# Patient Record
Sex: FEMALE | Race: WHITE | NOT HISPANIC OR LATINO | Employment: OTHER | URBAN - METROPOLITAN AREA
[De-identification: names, ages, dates, MRNs, and addresses within clinical notes are randomized per-mention and may not be internally consistent; named-entity substitution may affect disease eponyms.]

---

## 2017-12-13 ENCOUNTER — THERAPY VISIT (OUTPATIENT)
Dept: PHYSICAL THERAPY | Facility: CLINIC | Age: 67
End: 2017-12-13
Payer: MEDICARE

## 2017-12-13 DIAGNOSIS — R26.9 ABNORMAL GAIT: ICD-10-CM

## 2017-12-13 DIAGNOSIS — M79.672 LEFT FOOT PAIN: Primary | ICD-10-CM

## 2017-12-13 DIAGNOSIS — Z98.890 S/P FOOT SURGERY: ICD-10-CM

## 2017-12-13 PROCEDURE — 97161 PT EVAL LOW COMPLEX 20 MIN: CPT | Mod: GP | Performed by: PHYSICAL THERAPIST

## 2017-12-13 PROCEDURE — G8979 MOBILITY GOAL STATUS: HCPCS | Mod: GP | Performed by: PHYSICAL THERAPIST

## 2017-12-13 PROCEDURE — 97110 THERAPEUTIC EXERCISES: CPT | Mod: GP | Performed by: PHYSICAL THERAPIST

## 2017-12-13 PROCEDURE — G8978 MOBILITY CURRENT STATUS: HCPCS | Mod: GP | Performed by: PHYSICAL THERAPIST

## 2017-12-13 NOTE — PROGRESS NOTES
[facility] Evaluation  Subjective:  Physical Therapy Initial Evaluation   12/13/17  DIANE Rosa MD Precautions/Restrictions/MD instructions: Revision L 3rd TMT fusion, removal of ORIF of 2nd/3rd TMT, reverse Akin osteotomy of prox phalanx of L Great toe, E and T 10-12 visits   Therapist Impression:   Pt is a 68 y/o female, 7 wks s/p above procedure. Pt presents with pain, reduced ROM, weakness, reduced mm activation, and reduced soft tissue & scar mobility consistent with post op status. These impairments limit their ability to ambulate, navigate stairs, perform ADL's, and exercise. Skilled PT services necessary in order to reduce impairments and improve independent function.    Subjective:   Chief Complaint: Pt seven weeks s/p above procedure. Pt reports chronic bilateral foot pain with a lot of difficulty getting into fully plantarflexed position. Pt had been using short boot for six weeks and has been out of the boot for about a week now. Pt reports that the only time she is having pain is with walking now.  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 - be able to walk around with ease  Medications: thyroid, sleep  General health as reported by patient: good  Return to MD: 5 weeks  HPI                     Objective:  Ankle Examination    Gait: Reduced push off at TSt and muted pronation during MidSt on L    Transfers: mild use of UE's for sit to/from stand    Integumentary/Incision: Incisios healing well without overt signs of infection. Dorsal incision with reduced mobility    Edema:    General: mild dorsal and lateral foot as well as dorsal and medial great toe      ROM:   Talocrural Inv  Ev Subtalar Midtarsal Great Toe   Left 25-0-10 WNL WNL hypo hypo 0-10-80   Right 25-0-40 WNL WNL WNL WNL 15-0-75     Ankle Muscle Activation:   Right: grossly 5/5   Left: Ant tib and ext dig 5/5, post tib and peroneals 4/5    System    Physical Exam    General     ROS    Assessment/Plan:      Patient is a 67 year old female with L foot complaints.    Patient has the following significant findings with corresponding treatment plan.                Diagnosis 1:   Revision L 3rd TMT fusion, removal of ORIF of 2nd/3rd TMT, reverse Akin osteotomy of prox phalanx of L Great toe  Pain -  hot/cold therapy, manual therapy, splint/taping/bracing/orthotics, education, directional preference exercise and home program  Decreased ROM/flexibility - manual therapy and therapeutic exercise  Decreased joint mobility - manual therapy and therapeutic exercise  Decreased strength - therapeutic exercise and therapeutic activities  Decreased proprioception - neuro re-education and therapeutic activities  Inflammation - cold therapy  Edema - cold therapy  Impaired gait - gait training  Impaired muscle performance - neuro re-education  Decreased function - therapeutic activities    Therapy Evaluation Codes:   1) History comprised of:   Personal factors that impact the plan of care:      Past/current experiences and Time since onset of symptoms.    Comorbidity factors that impact the plan of care are:      Asthma, Depression, Menopausal, Osteoarthritis and hypothyroid, past osteoporosis.     Medications impacting care: Sleep and thyroid.  2) Examination  of Body Systems comprised of:   Body structures and functions that impact the plan of care:      Ankle and foot.   Activity limitations that impact the plan of care are:      Driving, Dressing, Squatting/kneeling, Stairs, Standing, Walking, Working and Sleeping.  3) Clinical presentation characteristics are:   Stable/Uncomplicated.  4) Decision-Making    Low complexity using standardized patient assessment instrument and/or measureable assessment of functional outcome.  Cumulative Therapy Evaluation is: Low complexity.    Previous and current functional limitations:  (See Goal Flow Sheet for this information)    Short term and Long term goals: (See Goal Flow Sheet for this information)     Communication ability:  Patient appears to be able to clearly communicate and understand verbal and written communication and follow directions correctly.  Treatment Explanation - The following has been discussed with the patient:   RX ordered/plan of care  Anticipated outcomes  Possible risks and side effects  This patient would benefit from PT intervention to resume normal activities.   Rehab potential is good.    Frequency:  1 X week, once daily  Duration:  for 10 weeks  Discharge Plan:  Achieve all LTG.  Independent in home treatment program.  Reach maximal therapeutic benefit.    Please refer to the daily flowsheet for treatment today, total treatment time and time spent performing 1:1 timed codes.

## 2017-12-13 NOTE — LETTER
DEPARTMENT OF HEALTH AND HUMAN SERVICES  CENTERS FOR MEDICARE & MEDICAID SERVICES    PLAN/UPDATED PLAN OF PROGRESS FOR OUTPATIENT REHABILITATION    PATIENTS NAME:  Karrie Sheriff   : 1950  PROVIDER NUMBER:    [id number]  [id number]  PROVIDER NAME: [facility] PHYSICAL THERAPY  MEDICAL RECORD NUMBER: [id number]   START OF CARE DATE:  SOC Date: 17   TYPE:  PT  PRIMARY/TREATMENT DIAGNOSIS: (Pertinent Medical Diagnosis)  Left foot pain  S/P foot surgery  Abnormal gait  VISITS FROM START OF CARE:  1  Rxs Used: 1     Physical Therapy Initial Evaluation   17  DIANE Rosa MD Precautions/Restrictions/MD instructions: Revision L 3rd TMT fusion, removal of ORIF of 2nd/3rd TMT, reverse Akin osteotomy of prox phalanx of L Great toe, E and T 10-12 visits   Therapist Impression:   Pt is a 66 y/o female, 7 wks s/p above procedure. Pt presents with pain, reduced ROM, weakness, reduced mm activation, and reduced soft tissue & scar mobility consistent with post op status. These impairments limit their ability to ambulate, navigate stairs, perform ADL's, and exercise. Skilled PT services necessary in order to reduce impairments and improve independent function.    Subjective:   Chief Complaint: Pt seven weeks s/p above procedure. Pt reports chronic bilateral foot pain with a lot of difficulty getting into fully plantarflexed position. Pt had been using short boot for six weeks and has been out of the boot for about a week now. Pt reports that the only time she is having pain is with walking now.  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 Dorsal incision with reduced mobility  Edema:    General: mild dorsal and lateral foot as well as dorsal and medial great toe  ROM:   Talocrural Inv  Ev Subtalar Midtarsal Great Toe   Left 25-0-10 WNL WNL hypo hypo 0-10-80   Right 25-0-40 WNL WNL WNL WNL 15-0-75     Ankle Muscle Activation:   Right: grossly 5/5   Left: Ant tib and ext dig 5/5, post tib and peroneals 4/5    Assessment/Plan:    Patient is a 67 year old female with L foot complaints.    Patient has the following significant findings with corresponding treatment plan.                Diagnosis 1:   Revision L 3rd TMT fusion, removal of ORIF of 2nd/3rd TMT, reverse Akin osteotomy of prox phalanx of L Great toe  Pain -  hot/cold therapy, manual therapy, splint/taping/bracing/orthotics, education, directional preference exercise and home program  Decreased ROM/flexibility - manual therapy and therapeutic exercise  Decreased joint mobility - manual therapy and therapeutic exercise  Decreased strength - therapeutic exercise and therapeutic activities  Decreased proprioception - neuro re-education and therapeutic activities  Inflammation - cold therapy  PATIENTS NAME:  Karrie Sheriff   :  1950    Edema - cold therapy  Impaired gait - gait training  Impaired muscle performance - neuro re-education  Decreased function - therapeutic activities  Therapy Evaluation Codes:   1) History comprised of:   Personal factors that impact the plan of care:      Past/current experiences and Time since onset of symptoms.    Comorbidity factors that impact the plan of care are:      Asthma, Depression, Menopausal, Osteoarthritis and hypothyroid, past osteoporosis.     Medications impacting care: Sleep and thyroid.  2) Examination of Body Systems comprised of:   Body structures and functions that impact the plan of care:      Ankle and foot.   Activity limitations that impact the plan of care are:      Driving, Dressing, Squatting/kneeling, Stairs, Standing, Walking, Working and Sleeping.  3) Clinical presentation characteristics are:   Stable/Uncomplicated.  4) Decision-Making    Low complexity using standardized patient assessment instrument and/or measureable assessment of functional outcome.  Cumulative Therapy Evaluation is: Low complexity.  Previous and current functional limitations:  (See Goal Flow Sheet for this information)    Short term and Long term goals: (See Goal Flow Sheet for this information)   Communication ability:  Patient appears to be able to clearly communicate and understand verbal and written communication and follow directions correctly.  Treatment Explanation - The following has been discussed with the patient:   RX ordered/plan of care  Anticipated outcomes  Possible risks and side effects  This patient would benefit from PT intervention to resume normal activities.   Rehab potential is good.  Frequency:  1 X week, once daily  Duration:  for 10 weeks  Discharge Plan:  Achieve all LTG.  Independent in home treatment program.  Reach maximal therapeutic benefit.                        PATIENTS NAME:  Karrie Sheriff   : 1950                Caregiver Signature/Credentials  "_____________________________ Date ________             Phill Lechuga DPT   I have reviewed and certified the need for these services and plan of treatment while under my care.        PHYSICIAN'S SIGNATURE:   _____________________________________  Date___________              Marin Rosa MD    Certification period:  Beginning of Cert date period: 12/13/17 to  End of Cert period date: 03/12/18     Functional Level Progress Report: Please see attached \"Goal Flow sheet for Functional level.\"    ____X____ Continue Services or       ________ DC Services                Service dates: From  SOC Date: 12/13/17 date to present                         "

## 2017-12-13 NOTE — MR AVS SNAPSHOT
After Visit Summary   12/13/2017    Karrie Sheriff    MRN: [id number]           Patient Information     Date Of Birth          1950        Visit Information        Provider Department      12/13/2017 4:00 PM Phill Lechuga, [facility] Physical Therapy        Today's Diagnoses     Left foot pain    -  1    S/P foot surgery        Abnormal gait           Follow-ups after your visit        Your next 10 appointments already scheduled     Dec 18, 2017  8:50 AM CST   MARITZA Extremity with Nyasia Cline PT   [facility] ([facility]  )    [address] [place] [address]   [phone]            Dec 29, 2017 12:10 PM CST   MARITZA Extremity with Phill Lechuga PT   [facility] Physical [facility] ([facility]  )    [address] [place] [address]   [phone]            Jan 05, 2018 12:10 PM CST   MARITZA Extremity with Phill Lechuga PT   [facility] Physical Therapy ([facility]  )    [address] [place] [address]   [phone]            Jan 12, 2018 12:10 PM CST   MARITZA Extremity with Phill Lechuga PT   [facility] Physical [facility] ([facility]  )    [address] [place] [address]   [phone]              Who to contact     If you have questions or need follow up information about today's clinic visit or your schedule please contact [facility] directly at [phone].  Normal or non-critical lab and imaging results will be communicated to you by MyChart, letter or phone within 4 business days after the clinic has received the results. If you do not hear from us within 7 days, please contact the clinic through MyChart or phone. If you have a critical or abnormal lab result, we will notify you by phone as  "soon as possible.  Submit refill requests through [employer] or call your pharmacy and they will forward the refill request to us. Please allow 3 business days for your refill to be completed.          Additional Information About Your Visit        [employer] Information     [employer] lets you send messages to your doctor, view your test results, renew your prescriptions, schedule appointments and more. To sign up, go to [email or web address] . Click on \"Log in\" on the left side of the screen, which will take you to the Welcome page. Then click on \"Sign up Now\" on the right side of the page.     You will be asked to enter the access code listed below, as well as some personal information. Please follow the directions to create your username and password.     Your access code is: [email or web address]  Expires: 3/14/2018 10:03 AM     Your access code will  in 90 days. If you need help or a new code, please call your [place] clinic or [phone].        Care EveryWhere ID     This is your Care EveryWhere ID. This could be used by other organizations to access your [place] medical records  [id number]         Blood Pressure from Last 3 Encounters:   14 101/54   13 123/71    Weight from Last 3 Encounters:   14 64.1 kg (141 lb 6.4 oz)   13 67.1 kg (148 lb)              We Performed the Following     HC PT EVAL, LOW COMPLEXITY     MARITZA CERT REPORT     MARITZA INITIAL EVAL REPORT     THERAPEUTIC EXERCISES        Primary Care Provider Office Phone # Fax #    Nyasia Sandoval [phone]       [facility] [address] [facility] [address]        Equal Access to Services     [facility] : Hadii alysha pena Sosherita, waaxda luqadaha, qaybta kaalmada damir, ga gasca . So [facility] [phone].    ATENCIÓN: Si habla español, tiene a kaba disposición servicios gratuitos de asistencia lingüística. Llame al [phone].    We comply with applicable " federal civil rights laws and [place] laws. We do not discriminate on the basis of race, color, national origin, age, disability, sex, sexual orientation, or gender identity.            Thank you!     Thank you for choosing [facility] PHYSICAL [facility]  for your care. Our goal is always to provide you with excellent care. Hearing back from our patients is one way we can continue to improve our services. Please take a few minutes to complete the written survey that you may receive in the mail after your visit with us. Thank you!             Your Updated Medication List - Protect others around you: Learn how to safely use, store and throw away your medicines at www.disposemymeds.org.          This list is accurate as of: 12/13/17 11:59 PM.  Always use your most recent med list.                   Brand Name Dispense Instructions for use Diagnosis    albuterol 108 (90 BASE) MCG/ACT Inhaler    PROAIR HFA/PROVENTIL HFA/VENTOLIN HFA     Inhale 2 puffs into the lungs every 4 hours as needed        AMBIEN PO      Take 2.5-5 mg by mouth nightly as needed        calcium-vitamin D 600-400 MG-UNIT per tablet    CALTRATE     Take 1 tablet by mouth daily        CIPROFLOXACIN PO      Take 250 mg by mouth 2 times daily as needed        DIFLUCAN PO      Take 150 mg by mouth as needed        estradiol 10 MCG Tabs vaginal tablet    VAGIFEM     Place 10 mcg vaginally twice a week        HYDROCHLOROTHIAZIDE PO      Take 50 mg by mouth daily        LAMICTAL PO      Take 100mg PO in morning and 400mg in the evening.        lithium 300 MG CR tablet    ESKALITH/LITHOBID     Take 300 mg by mouth every evening        NITROFURANTOIN MONOHYD MACRO PO      Take 100 mg by mouth as needed        NUVIGIL PO      Take 150 mg by mouth daily        OMEGA-3 FISH OIL PO      Take 1 g by mouth 2 times daily        RISEDRONATE SODIUM PO           RISPERIDONE PO      Take 1 mg by mouth 2 times daily        SYNTHROID PO       Take 112 mcg by mouth.        TOPAMAX PO      Take 50 mg by mouth At Bedtime        VALTREX PO      Take 1,000 mg by mouth 2 times daily        ZOCOR PO      Take 20 mg by mouth At Bedtime

## 2017-12-14 PROBLEM — Z98.890 S/P FOOT SURGERY: Status: ACTIVE | Noted: 2017-12-14

## 2017-12-14 PROBLEM — M79.672 LEFT FOOT PAIN: Status: ACTIVE | Noted: 2017-12-14

## 2017-12-14 PROBLEM — M79.671 RIGHT FOOT PAIN: Status: ACTIVE | Noted: 2017-12-14

## 2017-12-14 PROBLEM — R26.9 ABNORMAL GAIT: Status: ACTIVE | Noted: 2017-12-14

## 2017-12-18 ENCOUNTER — THERAPY VISIT (OUTPATIENT)
Dept: PHYSICAL THERAPY | Facility: CLINIC | Age: 67
End: 2017-12-18
Payer: MEDICARE

## 2017-12-18 DIAGNOSIS — M79.672 LEFT FOOT PAIN: ICD-10-CM

## 2017-12-18 DIAGNOSIS — R26.9 ABNORMAL GAIT: ICD-10-CM

## 2017-12-18 PROCEDURE — 97110 THERAPEUTIC EXERCISES: CPT | Mod: GP | Performed by: PHYSICAL THERAPIST

## 2017-12-18 PROCEDURE — 97140 MANUAL THERAPY 1/> REGIONS: CPT | Mod: GP | Performed by: PHYSICAL THERAPIST

## 2017-12-29 ENCOUNTER — THERAPY VISIT (OUTPATIENT)
Dept: PHYSICAL THERAPY | Facility: CLINIC | Age: 67
End: 2017-12-29
Payer: MEDICARE

## 2017-12-29 DIAGNOSIS — M79.672 LEFT FOOT PAIN: ICD-10-CM

## 2017-12-29 DIAGNOSIS — R26.9 ABNORMAL GAIT: ICD-10-CM

## 2017-12-29 PROCEDURE — 97112 NEUROMUSCULAR REEDUCATION: CPT | Mod: GP | Performed by: PHYSICAL THERAPIST

## 2017-12-29 PROCEDURE — 97110 THERAPEUTIC EXERCISES: CPT | Mod: GP | Performed by: PHYSICAL THERAPIST

## 2017-12-29 PROCEDURE — 97140 MANUAL THERAPY 1/> REGIONS: CPT | Mod: GP | Performed by: PHYSICAL THERAPIST

## 2018-01-11 ENCOUNTER — THERAPY VISIT (OUTPATIENT)
Dept: PHYSICAL THERAPY | Facility: CLINIC | Age: 68
End: 2018-01-11
Payer: MEDICARE

## 2018-01-11 DIAGNOSIS — M79.672 LEFT FOOT PAIN: ICD-10-CM

## 2018-01-11 DIAGNOSIS — R26.9 ABNORMAL GAIT: ICD-10-CM

## 2018-01-11 PROCEDURE — 97112 NEUROMUSCULAR REEDUCATION: CPT | Mod: GP | Performed by: PHYSICAL THERAPIST

## 2018-01-11 PROCEDURE — 97110 THERAPEUTIC EXERCISES: CPT | Mod: GP | Performed by: PHYSICAL THERAPIST

## 2018-01-16 ENCOUNTER — THERAPY VISIT (OUTPATIENT)
Dept: PHYSICAL THERAPY | Facility: CLINIC | Age: 68
End: 2018-01-16
Payer: MEDICARE

## 2018-01-16 DIAGNOSIS — R26.9 ABNORMAL GAIT: ICD-10-CM

## 2018-01-16 DIAGNOSIS — M79.672 LEFT FOOT PAIN: ICD-10-CM

## 2018-01-16 PROCEDURE — 97110 THERAPEUTIC EXERCISES: CPT | Mod: GP | Performed by: PHYSICAL THERAPIST

## 2018-01-16 PROCEDURE — 97140 MANUAL THERAPY 1/> REGIONS: CPT | Mod: GP | Performed by: PHYSICAL THERAPIST

## 2018-01-25 ENCOUNTER — THERAPY VISIT (OUTPATIENT)
Dept: PHYSICAL THERAPY | Facility: CLINIC | Age: 68
End: 2018-01-25
Payer: MEDICARE

## 2018-01-25 DIAGNOSIS — R26.9 ABNORMAL GAIT: ICD-10-CM

## 2018-01-25 DIAGNOSIS — M79.672 LEFT FOOT PAIN: ICD-10-CM

## 2018-01-25 PROCEDURE — 97140 MANUAL THERAPY 1/> REGIONS: CPT | Mod: GP | Performed by: PHYSICAL THERAPIST

## 2018-01-25 PROCEDURE — 97110 THERAPEUTIC EXERCISES: CPT | Mod: GP | Performed by: PHYSICAL THERAPIST

## 2018-01-25 NOTE — PROGRESS NOTES
Subjective:  HPI                    Objective:  System    Physical Exam    General     ROS    Assessment/Plan:    PROGRESS  REPORT    Progress reporting period is from 12/13/17 to 1/25/18.       SUBJECTIVE  Subjective changes noted by patient:   Subjective: Pt reports that she feels like her toe is getting a bit stronger. Pt reports that she continues to have soreness on the plantar aspect of her foot and great toe.    Current Pain level: 2/10.      Initial Pain level: 4/10.   Changes in function:  Yes (See Goal flowsheet attached for changes in current functional level)  Adverse reaction to treatment or activity: None    OBJECTIVE  Changes noted in objective findings:  Yes,   Objective: Scar tissue still not full mobile on dorsal surface of midfoot. MTP flex to 10 deg past neutral. Good DL calf raise but unable to perform full SL calf raise.     ASSESSMENT/PLAN  Updated problem list and treatment plan: Diagnosis 1:  Revision L 3rd TMT fusion, removal of ORIF of 2nd/3rd TMT, reverse Akin osteotomy of prox phalanx of L Great toe  Pain -  hot/cold therapy, manual therapy, splint/taping/bracing/orthotics, education and home program  Decreased ROM/flexibility - manual therapy and therapeutic exercise  Decreased joint mobility - manual therapy and therapeutic exercise  Decreased strength - therapeutic exercise and therapeutic activities  Decreased proprioception - neuro re-education and therapeutic activities  Impaired gait - gait training  Impaired muscle performance - neuro re-education  Decreased function - therapeutic activities  STG/LTGs have been met or progress has been made towards goals:  Yes (See Goal flow sheet completed today.)  Assessment of Progress: The patient's condition is improving.  The patient's condition has potential to improve.  Self Management Plans:  Patient has been instructed in a home treatment program.  I have re-evaluated this patient and find that the nature, scope, duration and intensity  of the therapy is appropriate for the medical condition of the patient.  Karrie continues to require the following intervention to meet STG and LTG's:  PT    Recommendations:  This patient would benefit from continued therapy.     Frequency:  2 X a month, once daily  Duration:  for 2 months        Please refer to the daily flowsheet for treatment today, total treatment time and time spent performing 1:1 timed codes.

## 2018-01-25 NOTE — MR AVS SNAPSHOT
"              After Visit Summary   1/25/2018    Karrie Sheriff    MRN: [id number]           Patient Information     Date Of Birth          1950        Visit Information        Provider Department      1/25/2018 12:10 PM Phill Lechuga PT [facility]        Today's Diagnoses     Left foot pain        Abnormal gait           Follow-ups after your visit        Your next 10 appointments already scheduled     Feb 01, 2018 10:50 AM CST   MARITZA Extremity with Phill Lechuga PT   [facility] ([facility]  )    [address] [place] [id number]   [phone]            Feb 08, 2018 10:50 AM CST   MARITZA Extremity with Phill Lechuga PT   [facility] ([facility]  )    [address] [place] [phone]              Who to contact     If you have questions or need follow up information about today's clinic visit or your schedule please contact [facility] PHYSICAL THERAPY directly at [phone].  Normal or non-critical lab and imaging results will be communicated to you by Bubbleballhart, letter or phone within 4 business days after the clinic has received the results. If you do not hear from us within 7 days, please contact the clinic through Think Skyt or phone. If you have a critical or abnormal lab result, we will notify you by phone as soon as possible.  Submit refill requests through [employer] or call your pharmacy and they will forward the refill request to us. Please allow 3 business days for your refill to be completed.          Additional Information About Your Visit        Bubbleballhart Information     [employer] lets you send messages to your doctor, view your test results, renew your prescriptions, schedule appointments and more. To sign up, go to [email or web address] . Click on \"Log in\" on the left side " "of the screen, which will take you to the Welcome page. Then click on \"Sign up Now\" on the right side of the page.     You will be asked to enter the access code listed below, as well as some personal information. Please follow the directions to create your username and password.     Your access code is: [email or web address]  Expires: 3/14/2018 10:03 AM     Your access code will  in 90 days. If you need help or a new code, please call your [place] clinic or [phone].        Care EveryWhere ID     This is your Care EveryWhere ID. This could be used by other organizations to access your [place] medical records  [id number]         Blood Pressure from Last 3 Encounters:   14 101/54   13 123/71    Weight from Last 3 Encounters:   14 64.1 kg (141 lb 6.4 oz)   13 67.1 kg (148 lb)              We Performed the Following     MARITZA PROGRESS NOTES REPORT     MANUAL THER TECH,1+REGIONS,EA 15 MIN     THERAPEUTIC EXERCISES        Primary Care Provider Office Phone # Fax #    Nyasia Sandoval [phone]       [facility] [address] [id number]        Equal Access to Services     KEISHA LINARES AH: Hadii aad ku hadasho Soomaali, waaxda luqadaha, qaybta kaalmada adeegyada, waxay idiin hayaan adeeg kharajoyce la'bishopn ah. So [facility] [phone].    ATENCIÓN: Si habla español, tiene a kaba disposición servicios gratuitos de asistencia lingüística. Lllara al [phone].    We comply with applicable federal civil rights laws and [place] laws. We do not discriminate on the basis of race, color, national origin, age, disability, sex, sexual orientation, or gender identity.            Thank you!     Thank you for choosing [facility] PH[facility]ER[facility]  for your care. Our goal is always to provide you with excellent care. Hearing back from our patients is one way we can continue to improve our services. Please take a few minutes to complete the " written survey that you may receive in the mail after your visit with us. Thank you!             Your Updated Medication List - Protect others around you: Learn how to safely use, store and throw away your medicines at www.disposemymeds.org.          This list is accurate as of 1/25/18 12:51 PM.  Always use your most recent med list.                   Brand Name Dispense Instructions for use Diagnosis    albuterol 108 (90 BASE) MCG/ACT Inhaler    PROAIR HFA/PROVENTIL HFA/VENTOLIN HFA     Inhale 2 puffs into the lungs every 4 hours as needed        AMBIEN PO      Take 2.5-5 mg by mouth nightly as needed        calcium-vitamin D 600-400 MG-UNIT per tablet    CALTRATE     Take 1 tablet by mouth daily        CIPROFLOXACIN PO      Take 250 mg by mouth 2 times daily as needed        DIFLUCAN PO      Take 150 mg by mouth as needed        estradiol 10 MCG Tabs vaginal tablet    VAGIFEM     Place 10 mcg vaginally twice a week        HYDROCHLOROTHIAZIDE PO      Take 50 mg by mouth daily        LAMICTAL PO      Take 100mg PO in morning and 400mg in the evening.        lithium 300 MG CR tablet    ESKALITH/LITHOBID     Take 300 mg by mouth every evening        NITROFURANTOIN MONOHYD MACRO PO      Take 100 mg by mouth as needed        NUVIGIL PO      Take 150 mg by mouth daily        OMEGA-3 FISH OIL PO      Take 1 g by mouth 2 times daily        RISEDRONATE SODIUM PO           RISPERIDONE PO      Take 1 mg by mouth 2 times daily        SYNTHROID PO      Take 112 mcg by mouth.        TOPAMAX PO      Take 50 mg by mouth At Bedtime        VALTREX PO      Take 1,000 mg by mouth 2 times daily        ZOCOR PO      Take 20 mg by mouth At Bedtime

## 2018-01-25 NOTE — LETTER
[place] [facility]  [address] [address]  [phone]    2018    Re: Karrie Sheriff   :   1950  MRN:  [id number]   REFERRING PHYSICIAN:   Marin Rosa    [facility]    Date of Initial Evaluation:  17  Visits:  Rxs Used: 6  Reason for Referral:     Left foot pain  Abnormal gait    EVALUATION SUMMARY    Assessment/Plan:    PROGRESS  REPORT    Progress reporting period is from 17 to 18.       SUBJECTIVE  Subjective changes noted by patient:   Subjective: Pt reports that she feels like her toe is getting a bit stronger. Pt reports that she continues to have soreness on the plantar aspect of her foot and great toe.    Current Pain level: 2/10.      Initial Pain level: 4/10.   Changes in function:  Yes (See Goal flowsheet attached for changes in current functional level)  Adverse reaction to treatment or activity: None    OBJECTIVE  Changes noted in objective findings:  Yes,   Objective: Scar tissue still not full mobile on dorsal surface of midfoot. MTP flex to 10 deg past neutral. Good DL calf raise but unable to perform full SL calf raise.     ASSESSMENT/PLAN  Updated problem list and treatment plan: Diagnosis 1:  Revision L 3rd TMT fusion, removal of ORIF of 2nd/3rd TMT, reverse Akin osteotomy of prox phalanx of L Great toe  Pain -  hot/cold therapy, manual therapy, splint/taping/bracing/orthotics, education and home program  Decreased ROM/flexibility - manual therapy and therapeutic exercise  Decreased joint mobility - manual therapy and therapeutic exercise  Decreased strength - therapeutic exercise and therapeutic activities  Decreased proprioception - neuro re-education and therapeutic activities  Impaired gait - gait training  Impaired muscle performance - neuro re-education  Re: Karrie IGNACIO Sheriff   :   1950    Decreased function - therapeutic  activities  STG/LTGs have been met or progress has been made towards goals:  Yes (See Goal flow sheet completed today.)  Assessment of Progress: The patient's condition is improving.  The patient's condition has potential to improve.  Self Management Plans:  Patient has been instructed in a home treatment program.  I have re-evaluated this patient and find that the nature, scope, duration and intensity of the therapy is appropriate for the medical condition of the patient.  Karrie continues to require the following intervention to meet STG and LTG's:  PT    Recommendations:  This patient would benefit from continued therapy.     Frequency:  2 X a month, once daily  Duration:  for 2 months    Please refer to the daily flowsheet for treatment today, total treatment time and time spent performing 1:1 timed codes.        Thank you for your referral.      INQUIRIES  Therapist: Phill Lechuga, PT, DPT, SCS, [facility]  [facility]  [address] [place] [id number]  Phone: [phone]  Fax: [phone]

## 2018-02-01 ENCOUNTER — THERAPY VISIT (OUTPATIENT)
Dept: PHYSICAL THERAPY | Facility: CLINIC | Age: 68
End: 2018-02-01
Payer: MEDICARE

## 2018-02-01 DIAGNOSIS — M79.672 LEFT FOOT PAIN: ICD-10-CM

## 2018-02-01 DIAGNOSIS — R26.9 ABNORMAL GAIT: ICD-10-CM

## 2018-02-01 PROCEDURE — 97110 THERAPEUTIC EXERCISES: CPT | Mod: GP | Performed by: PHYSICAL THERAPIST

## 2018-02-01 PROCEDURE — 97112 NEUROMUSCULAR REEDUCATION: CPT | Mod: GP | Performed by: PHYSICAL THERAPIST

## 2018-02-15 ENCOUNTER — THERAPY VISIT (OUTPATIENT)
Dept: PHYSICAL THERAPY | Facility: CLINIC | Age: 68
End: 2018-02-15
Payer: MEDICARE

## 2018-02-15 DIAGNOSIS — R26.9 ABNORMAL GAIT: ICD-10-CM

## 2018-02-15 DIAGNOSIS — M79.672 LEFT FOOT PAIN: ICD-10-CM

## 2018-02-15 PROCEDURE — 97110 THERAPEUTIC EXERCISES: CPT | Mod: GP | Performed by: PHYSICAL THERAPIST

## 2018-02-15 PROCEDURE — 97140 MANUAL THERAPY 1/> REGIONS: CPT | Mod: GP | Performed by: PHYSICAL THERAPIST

## 2018-03-07 ENCOUNTER — THERAPY VISIT (OUTPATIENT)
Dept: PHYSICAL THERAPY | Facility: CLINIC | Age: 68
End: 2018-03-07
Payer: MEDICARE

## 2018-03-07 DIAGNOSIS — M79.672 LEFT FOOT PAIN: ICD-10-CM

## 2018-03-07 DIAGNOSIS — R26.9 ABNORMAL GAIT: ICD-10-CM

## 2018-03-07 PROCEDURE — 97110 THERAPEUTIC EXERCISES: CPT | Mod: GP | Performed by: PHYSICAL THERAPIST

## 2018-04-11 ENCOUNTER — THERAPY VISIT (OUTPATIENT)
Dept: PHYSICAL THERAPY | Facility: CLINIC | Age: 68
End: 2018-04-11
Payer: MEDICARE

## 2018-04-11 DIAGNOSIS — M79.672 LEFT FOOT PAIN: Primary | ICD-10-CM

## 2018-04-11 DIAGNOSIS — Z98.890 S/P FOOT SURGERY: ICD-10-CM

## 2018-04-11 PROCEDURE — G8979 MOBILITY GOAL STATUS: HCPCS | Mod: GP | Performed by: PHYSICAL THERAPIST

## 2018-04-11 PROCEDURE — 97161 PT EVAL LOW COMPLEX 20 MIN: CPT | Mod: GP | Performed by: PHYSICAL THERAPIST

## 2018-04-11 PROCEDURE — G8978 MOBILITY CURRENT STATUS: HCPCS | Mod: GP | Performed by: PHYSICAL THERAPIST

## 2018-04-11 PROCEDURE — 97110 THERAPEUTIC EXERCISES: CPT | Mod: GP | Performed by: PHYSICAL THERAPIST

## 2018-04-11 NOTE — MR AVS SNAPSHOT
After Visit Summary   4/11/2018    Karrie Sheriff    MRN: [id number]           Patient Information     Date Of Birth          1950        Visit Information        Provider Department      4/11/2018 2:00 PM Duke Carrizales PT [facility] Therapy        Today's Diagnoses     Left foot pain    -  1    S/P foot surgery           Follow-ups after your visit        Your next 10 appointments already scheduled     Apr 18, 2018  8:50 AM CDT   MARITZA Extremity with Duke Carrizales PT   [facility] Therapy ([facility]  )    [address] [place] [id number]   [phone]            Apr 25, 2018  1:30 PM CDT   MARITZA Extremity with Phill Lechuga PT   [facility] ([facility]  )    [address] [place] [phone]   [phone]            May 02, 2018  1:30 PM CDT   MARITZA Extremity with Phill Lechuga PT   [facility] ([facility]  )    [address] [place] [phone]   [phone]            May 08, 2018  1:30 PM CDT   MARITZA Extremity with Phill Lechuga PT   [facility] ([facility]  )    [address] [place] [id number] [phone]              Who to contact     If you have questions or need follow up information about today's clinic visit or your schedule please contact [facility] directly at [phone].  Normal or non-critical lab and imaging results will be communicated to you by MyChart, letter or phone within 4 business days after the clinic has received the results. If you do not hear from us within 7 days, please contact the clinic through MyChart or phone. If you have a critical or abnormal lab result, we will notify you by phone as soon as  "possible.  Submit refill requests through Gatheredtable or call your pharmacy and they will forward the refill request to us. Please allow 3 business days for your refill to be completed.          Additional Information About Your Visit        [employer]harESCAPESwithYOU Information     Gatheredtable lets you send messages to your doctor, view your test results, renew your prescriptions, schedule appointments and more. To sign up, go to [email or web address] . Click on \"Log in\" on the left side of the screen, which will take you to the Welcome page. Then click on \"Sign up Now\" on the right side of the page.     You will be asked to enter the access code listed below, as well as some personal information. Please follow the directions to create your username and password.     Your access code is: [email or web address]  Expires: 7/10/2018  4:12 PM     Your access code will  in 90 days. If you need help or a new code, please call your [place] clinic or [phone].        Care EveryWhere ID     This is your Care EveryWhere ID. This could be used by other organizations to access your [place] medical records  [id number]         Blood Pressure from Last 3 Encounters:   14 101/54   13 123/71    Weight from Last 3 Encounters:   14 64.1 kg (141 lb 6.4 oz)   13 67.1 kg (148 lb)              We Performed the Following     HC PT EVAL, LOW COMPLEXITY     MARITZA CERT REPORT     MARITZA INITIAL EVAL REPORT     THERAPEUTIC EXERCISES        Primary Care Provider Office Phone # Fax #    Nyasia Sandoval [phone]       [facility] [address] [address]        Equal Access to Services     [facility] Hospital[facility]ANKY : Hadii alysha Salazar, waaxda luqadaha, qaybta kaalmada damir, ga gasca . So [facility] [phone].    ATENCIÓN: Si habla español, tiene a kaba disposición servicios gratuitos de asistencia lingüística. Llame al [phone].    We comply with applicable federal " civil rights laws and [place] laws. We do not discriminate on the basis of race, color, national origin, age, disability, sex, sexual orientation, or gender identity.            Thank you!     Thank you for choosing [place] [facility] PHYSICAL [facility]  for your care. Our goal is always to provide you with excellent care. Hearing back from our patients is one way we can continue to improve our services. Please take a few minutes to complete the written survey that you may receive in the mail after your visit with us. Thank you!             Your Updated Medication List - Protect others around you: Learn how to safely use, store and throw away your medicines at www.disposemymeds.org.          This list is accurate as of 4/11/18  4:12 PM.  Always use your most recent med list.                   Brand Name Dispense Instructions for use Diagnosis    albuterol 108 (90 Base) MCG/ACT Inhaler    PROAIR HFA/PROVENTIL HFA/VENTOLIN HFA     Inhale 2 puffs into the lungs every 4 hours as needed        AMBIEN PO      Take 2.5-5 mg by mouth nightly as needed        calcium-vitamin D 600-400 MG-UNIT per tablet    CALTRATE     Take 1 tablet by mouth daily        CIPROFLOXACIN PO      Take 250 mg by mouth 2 times daily as needed        DIFLUCAN PO      Take 150 mg by mouth as needed        estradiol 10 MCG Tabs vaginal tablet    VAGIFEM     Place 10 mcg vaginally twice a week        HYDROCHLOROTHIAZIDE PO      Take 50 mg by mouth daily        LAMICTAL PO      Take 100mg PO in morning and 400mg in the evening.        lithium 300 MG CR tablet    ESKALITH/LITHOBID     Take 300 mg by mouth every evening        NITROFURANTOIN MONOHYD MACRO PO      Take 100 mg by mouth as needed        NUVIGIL PO      Take 150 mg by mouth daily        OMEGA-3 FISH OIL PO      Take 1 g by mouth 2 times daily        RISEDRONATE SODIUM PO           RISPERIDONE PO      Take 1 mg by mouth 2 times daily        SYNTHROID PO      Take  112 mcg by mouth.        TOPAMAX PO      Take 50 mg by mouth At Bedtime        VALTREX PO      Take 1,000 mg by mouth 2 times daily        ZOCOR PO      Take 20 mg by mouth At Bedtime

## 2018-04-11 NOTE — LETTER
DEPARTMENT OF HEALTH AND HUMAN SERVICES  CENTERS FOR MEDICARE & MEDICAID SERVICES    PLAN/UPDATED PLAN OF PROGRESS FOR OUTPATIENT REHABILITATION    PATIENTS NAME:  Karrie Sheriff   : 1950  PROVIDER NUMBER:    [id number]  [id number]  PROVIDER NAME: [facility]  MEDICAL RECORD NUMBER: [id number]   START OF CARE DATE:  SOC Date: 18  TYPE:  PT  PRIMARY/TREATMENT DIAGNOSIS: (Pertinent Medical Diagnosis)     Left foot pain  S/P foot surgery    VISITS FROM START OF CARE:  Rxs Used: 1     [place] [facility] Initial Evaluation    Subjective:  Pt returns to PT with a chief complaint of continued L foot pain and 1st MTP weakness following recent foot/ankle surgery on 3/24/18 with a PT referral for foot intrinsic strengthening, aggressive EHL stretching, and FHL strengthening. Pt unsure of the exact procedure performed at her most recent surgery on 3/24/18, and continues to have concerns about the position of her 1st MTP. Pt reports weaning from her CAM boot for the past 2 weeks, and now wears a post-op shoe due to her increased swelling.     Patient is a 67 year old female presenting with rehab left ankle/foot hpi.   Karrie Sheriff is a 67 year old female with a left foot condition.  Condition occurred with:  Degenerative joint disease.  Condition occurred: for unknown reasons.  This is a new condition  S/p L foot surgery 3/24/18.      Patient reports pain:  Anterior and great toe.  Radiates to:  No radiation.  Pain is described as aching and is intermittent and reported as 4/10.  Associated symptoms:  Loss of motion/stiffness, loss of strength and edema. Pain is the same all the time.  Symptoms are exacerbated by weight bearing, walking, standing, ascending stairs and descending stairs and relieved by rest and ice.  Since onset symptoms are gradually improving.        General health as reported by patient is good.  Pertinent medical history  includes:  Osteoarthritis, thyroid problems and mental illness.  Medical allergies: see chart.  Surgical history: L shoulder; multiple B feet.  Current medications:  Sleep medication and thyroid medication.  Current occupation is Psychiatrist.      Barriers include:  None as reported by patient.    Red flags:  None as reported by patient.    Objective:  Gait:  Post-op shoe  Gait Type:  Antalgic         PATIENTS NAME:  Karrie Sheriff   : 1950    Ankle/Foot Evaluation  ROM:      PROM:    Great Toe Flexion: Left:  ~10     Right:     Great Toe Extension: Left:    80     Right:    Strength:    Flexion Great Toe:Left: 4+/5   Pain:+      FUNCTIONAL TESTS: Functional test ankle: lacks single leg balance on L       Assessment/Plan:    Patient is a 67 year old female with left side ankle complaints.    Patient has the following significant findings with corresponding treatment plan.                Diagnosis 1:  L foot surgery    Pain -  hot/cold therapy, manual therapy, splint/taping/bracing/orthotics, self management and education  Decreased ROM/flexibility - manual therapy and therapeutic exercise  Decreased joint mobility - manual therapy and therapeutic exercise  Decreased strength - therapeutic exercise and therapeutic activities  Impaired muscle performance - neuro re-education      Therapy Evaluation Codes:   1) History comprised of:   Personal factors that impact the plan of care:      None.    Comorbidity factors that impact the plan of care are:      None.     Medications impacting care: None.  2) Examination of Body Systems comprised of:   Body structures and functions that impact the plan of care:      Ankle.   Activity limitations that impact the plan of care are:      Lifting, Squatting/kneeling, Stairs, Standing and Walking.  3) Clinical presentation characteristics are:   Stable/Uncomplicated.  4) Decision-Making    Low complexity using standardized patient assessment instrument and/or   measureable  "assessment of functional outcome.    Cumulative Therapy Evaluation is: Low complexity.    Previous and current functional limitations:  (See Goal Flow Sheet for this information)    Short term and Long term goals: (See Goal Flow Sheet for this information)         PATIENTS NAME:  Karrie Sheriff   : 1950    Communication ability:  Patient appears to be able to clearly communicate and understand verbal and written communication and follow directions correctly.  Treatment Explanation - The following has been discussed with the patient:   RX ordered/plan of care  Anticipated outcomes  Possible risks and side effects  This patient would benefit from PT intervention to resume normal activities.   Rehab potential is good.    Frequency:  1 X week, once daily  Duration:  for 8 weeks  Discharge Plan:  Achieve all LTG.  Independent in home treatment program.  Reach maximal therapeutic benefit.    Please refer to the daily flowsheet for treatment today, total treatment time and time spent performing 1:1 timed codes.         Caregiver Signature/Credentials _____________________________ Date ________        Duke Carrizales DPT   I have reviewed and certified the need for these services and plan of treatment while under my care.        PHYSICIAN'S SIGNATURE:   ______________________________________ Date___________     Marin Rosa MD    Certification period:  Beginning of Cert date period: 18 to  End of Cert period date: 18     Functional Level Progress Report: Please see attached \"Goal Flow sheet for Functional level.\"    ____X____ Continue Services or       ________ DC Services                Service dates: From  SOC Date: 18 date to present                         "

## 2018-04-11 NOTE — PROGRESS NOTES
[facility] Initial Evaluation    Subjective:  Pt returns to PT with a chief complaint of continued L foot pain and 1st MTP weakness following recent foot/ankle surgery on 3/24/18 with a PT referral for foot intrinsic strengthening, aggressive EHL stretching, and FHL strengthening. Pt unsure of the exact procedure performed at her most recent surgery on 3/24/18, and continues to have concerns about the position of her 1st MTP. Pt reports weaning from her CAM boot for the past 2 weeks, and now wears a post-op shoe due to her increased swelling.     Patient is a 67 year old female presenting with rehab left ankle/foot hpi.   Karrie Sheriff is a 67 year old female with a left foot condition.  Condition occurred with:  Degenerative joint disease.  Condition occurred: for unknown reasons.  This is a new condition  S/p L foot surgery 3/24/18.    Patient reports pain:  Anterior and great toe.  Radiates to:  No radiation.  Pain is described as aching and is intermittent and reported as 4/10.  Associated symptoms:  Loss of motion/stiffness, loss of strength and edema. Pain is the same all the time.  Symptoms are exacerbated by weight bearing, walking, standing, ascending stairs and descending stairs and relieved by rest and ice.  Since onset symptoms are gradually improving.        General health as reported by patient is good.  Pertinent medical history includes:  Osteoarthritis, thyroid problems and mental illness.  Medical allergies: see chart.  Surgical history: L shoulder; multiple B feet.  Current medications:  Sleep medication and thyroid medication.  Current occupation is Psychiatrist  .        Barriers include:  None as reported by patient.    Red flags:  None as reported by patient.                        Objective:    Gait:  Post-op shoe    Gait Type:  Antalgic               Ankle/Foot Evaluation  ROM:      PROM:            Great Toe Flexion: Left:  ~10     Right:     Great Toe Extension: Left:     80     Right:      Strength:            Flexion Great Toe:Left: 4+/5   Pain:+                            FUNCTIONAL TESTS: Functional test ankle: lacks single leg balance on L                                                               General     ROS    Assessment/Plan:    Patient is a 67 year old female with left side ankle complaints.    Patient has the following significant findings with corresponding treatment plan.                Diagnosis 1:  L foot surgery    Pain -  hot/cold therapy, manual therapy, splint/taping/bracing/orthotics, self management and education  Decreased ROM/flexibility - manual therapy and therapeutic exercise  Decreased joint mobility - manual therapy and therapeutic exercise  Decreased strength - therapeutic exercise and therapeutic activities  Impaired muscle performance - neuro re-education    Therapy Evaluation Codes:   1) History comprised of:   Personal factors that impact the plan of care:      None.    Comorbidity factors that impact the plan of care are:      None.     Medications impacting care: None.  2) Examination of Body Systems comprised of:   Body structures and functions that impact the plan of care:      Ankle.   Activity limitations that impact the plan of care are:      Lifting, Squatting/kneeling, Stairs, Standing and Walking.  3) Clinical presentation characteristics are:   Stable/Uncomplicated.  4) Decision-Making    Low complexity using standardized patient assessment instrument and/or measureable assessment of functional outcome.  Cumulative Therapy Evaluation is: Low complexity.    Previous and current functional limitations:  (See Goal Flow Sheet for this information)    Short term and Long term goals: (See Goal Flow Sheet for this information)     Communication ability:  Patient appears to be able to clearly communicate and understand verbal and written communication and follow directions correctly.  Treatment Explanation - The following has been discussed  with the patient:   RX ordered/plan of care  Anticipated outcomes  Possible risks and side effects  This patient would benefit from PT intervention to resume normal activities.   Rehab potential is good.    Frequency:  1 X week, once daily  Duration:  for 8 weeks  Discharge Plan:  Achieve all LTG.  Independent in home treatment program.  Reach maximal therapeutic benefit.    Please refer to the daily flowsheet for treatment today, total treatment time and time spent performing 1:1 timed codes.

## 2018-04-16 ENCOUNTER — THERAPY VISIT (OUTPATIENT)
Dept: PHYSICAL THERAPY | Facility: CLINIC | Age: 68
End: 2018-04-16
Payer: MEDICARE

## 2018-04-16 DIAGNOSIS — Z98.890 S/P FOOT SURGERY: ICD-10-CM

## 2018-04-16 DIAGNOSIS — M79.672 LEFT FOOT PAIN: ICD-10-CM

## 2018-04-16 PROCEDURE — 97110 THERAPEUTIC EXERCISES: CPT | Mod: GP | Performed by: PHYSICAL THERAPIST

## 2018-04-16 PROCEDURE — 97140 MANUAL THERAPY 1/> REGIONS: CPT | Mod: GP | Performed by: PHYSICAL THERAPIST

## 2018-04-25 ENCOUNTER — THERAPY VISIT (OUTPATIENT)
Dept: PHYSICAL THERAPY | Facility: CLINIC | Age: 68
End: 2018-04-25
Payer: MEDICARE

## 2018-04-25 DIAGNOSIS — M79.672 LEFT FOOT PAIN: ICD-10-CM

## 2018-04-25 DIAGNOSIS — Z98.890 S/P FOOT SURGERY: ICD-10-CM

## 2018-04-25 PROBLEM — R26.9 ABNORMAL GAIT: Status: RESOLVED | Noted: 2017-12-14 | Resolved: 2018-04-25

## 2018-04-25 PROCEDURE — 97110 THERAPEUTIC EXERCISES: CPT | Mod: GP | Performed by: PHYSICAL THERAPIST

## 2018-05-02 ENCOUNTER — THERAPY VISIT (OUTPATIENT)
Dept: PHYSICAL THERAPY | Facility: CLINIC | Age: 68
End: 2018-05-02
Payer: MEDICARE

## 2018-05-02 DIAGNOSIS — M79.672 LEFT FOOT PAIN: ICD-10-CM

## 2018-05-02 DIAGNOSIS — Z98.890 S/P FOOT SURGERY: ICD-10-CM

## 2018-05-02 PROCEDURE — 97110 THERAPEUTIC EXERCISES: CPT | Mod: GP | Performed by: PHYSICAL THERAPIST

## 2018-05-02 PROCEDURE — 97112 NEUROMUSCULAR REEDUCATION: CPT | Mod: GP | Performed by: PHYSICAL THERAPIST

## 2018-05-08 ENCOUNTER — THERAPY VISIT (OUTPATIENT)
Dept: PHYSICAL THERAPY | Facility: CLINIC | Age: 68
End: 2018-05-08
Payer: MEDICARE

## 2018-05-08 DIAGNOSIS — Z98.890 S/P FOOT SURGERY: ICD-10-CM

## 2018-05-08 DIAGNOSIS — M79.672 LEFT FOOT PAIN: ICD-10-CM

## 2018-05-08 PROCEDURE — 97140 MANUAL THERAPY 1/> REGIONS: CPT | Mod: GP | Performed by: PHYSICAL THERAPIST

## 2018-05-08 PROCEDURE — 97110 THERAPEUTIC EXERCISES: CPT | Mod: GP | Performed by: PHYSICAL THERAPIST

## 2018-05-15 ENCOUNTER — THERAPY VISIT (OUTPATIENT)
Dept: PHYSICAL THERAPY | Facility: CLINIC | Age: 68
End: 2018-05-15
Payer: MEDICARE

## 2018-05-15 DIAGNOSIS — M79.672 LEFT FOOT PAIN: ICD-10-CM

## 2018-05-15 DIAGNOSIS — Z98.890 S/P FOOT SURGERY: ICD-10-CM

## 2018-05-15 PROCEDURE — 97110 THERAPEUTIC EXERCISES: CPT | Mod: GP | Performed by: PHYSICAL THERAPIST

## 2018-05-15 PROCEDURE — 97140 MANUAL THERAPY 1/> REGIONS: CPT | Mod: GP | Performed by: PHYSICAL THERAPIST

## 2018-05-29 ENCOUNTER — THERAPY VISIT (OUTPATIENT)
Dept: PHYSICAL THERAPY | Facility: CLINIC | Age: 68
End: 2018-05-29
Payer: MEDICARE

## 2018-05-29 DIAGNOSIS — Z98.890 S/P FOOT SURGERY: ICD-10-CM

## 2018-05-29 DIAGNOSIS — M79.672 LEFT FOOT PAIN: ICD-10-CM

## 2018-05-29 PROCEDURE — 97112 NEUROMUSCULAR REEDUCATION: CPT | Mod: GP | Performed by: PHYSICAL THERAPIST

## 2018-05-29 PROCEDURE — 97110 THERAPEUTIC EXERCISES: CPT | Mod: GP | Performed by: PHYSICAL THERAPIST

## 2018-05-29 NOTE — MR AVS SNAPSHOT
After Visit Summary   5/29/2018    Karrie Sheriff    MRN: [id number]           Patient Information     Date Of Birth          1950        Visit Information        Provider Department      5/29/2018 9:00 AM Phill Lechuga PT [facility] Physical [facility]        Today's Diagnoses     Left foot pain        S/P foot surgery           Follow-ups after your visit        Who to contact     If you have questions or need follow up information about today's clinic visit or your schedule please contact [facility] PHYSICAL [facility] directly at [phone].  Normal or non-critical lab and imaging results will be communicated to you by MyChart, letter or phone within 4 business days after the clinic has received the results. If you do not hear from us within 7 days, please contact the clinic through MyChart or phone. If you have a critical or abnormal lab result, we will notify you by phone as soon as possible.  Submit refill requests through [employer] or call your pharmacy and they will forward the refill request to us. Please allow 3 business days for your refill to be completed.          Additional Information About Your Visit        Care EveryWhere ID     This is your Care EveryWhere ID. This could be used by other organizations to access your [facility] medical records  [id number]         Blood Pressure from Last 3 Encounters:   03/18/14 101/54   01/16/13 123/71    Weight from Last 3 Encounters:   03/18/14 64.1 kg (141 lb 6.4 oz)   01/16/13 67.1 kg (148 lb)              We Performed the Following     NEUROMUSCULAR RE-EDUCATION     THERAPEUTIC EXERCISES        Primary Care Provider Office Phone # Fax Brian Murrell Jaime [phone]       [facility] [address] [address]        Equal Access to Services     KEISHA LINARES AH: divina Rodríguez, danica reich,  ga fournier tessy messer'aan ah. So [place] -[phone].    ATENCIÓN: Si habla otilia, tiene a kaba disposición servicios gratuitos de asistencia lingüística. Farooq graf [phone].    We comply with applicable federal civil rights laws and [place] laws. We do not discriminate on the basis of race, color, national origin, age, disability, sex, sexual orientation, or gender identity.            Thank you!     Thank you for choosing [facility] PHYSICAL THERAP[facility]  for your care. Our goal is always to provide you with excellent care. Hearing back from our patients is one way we can continue to improve our services. Please take a few minutes to complete the written survey that you may receive in the mail after your visit with us. Thank you!             Your Updated Medication List - Protect others around you: Learn how to safely use, store and throw away your medicines at www.disposemymeds.org.          This list is accurate as of 5/29/18  9:41 AM.  Always use your most recent med list.                   Brand Name Dispense Instructions for use Diagnosis    albuterol 108 (90 Base) MCG/ACT Inhaler    PROAIR HFA/PROVENTIL HFA/VENTOLIN HFA     Inhale 2 puffs into the lungs every 4 hours as needed        AMBIEN PO      Take 2.5-5 mg by mouth nightly as needed        calcium-vitamin D 600-400 MG-UNIT per tablet    CALTRATE     Take 1 tablet by mouth daily        CIPROFLOXACIN PO      Take 250 mg by mouth 2 times daily as needed        DIFLUCAN PO      Take 150 mg by mouth as needed        estradiol 10 MCG Tabs vaginal tablet    VAGIFEM     Place 10 mcg vaginally twice a week        HYDROCHLOROTHIAZIDE PO      Take 50 mg by mouth daily        LAMICTAL PO      Take 100mg PO in morning and 400mg in the evening.        lithium 300 MG CR tablet    ESKALITH/LITHOBID     Take 300 mg by mouth every evening        NITROFURANTOIN MONOHYD MACRO PO      Take 100 mg by mouth as needed         NUVIGIL PO      Take 150 mg by mouth daily        OMEGA-3 FISH OIL PO      Take 1 g by mouth 2 times daily        RISEDRONATE SODIUM PO           RISPERIDONE PO      Take 1 mg by mouth 2 times daily        SYNTHROID PO      Take 112 mcg by mouth.        TOPAMAX PO      Take 50 mg by mouth At Bedtime        VALTREX PO      Take 1,000 mg by mouth 2 times daily        ZOCOR PO      Take 20 mg by mouth At Bedtime

## 2019-06-06 NOTE — PROGRESS NOTES
Discharge Note    Progress reporting period is from last progress note on 04/11/18 to May 29, 2018.    Karrie failed to follow up and current status is unknown.  Please see information below for last relevant information on current status.  Patient seen for 7 visits.    SUBJECTIVE  Subjective changes noted by patient:  Pt notes that she still has some pain with walking but less intensity. F/u with Dr. Rosa two weeks ago. Notes that healing looked good on imaging.  .  Current pain level is 1/10.     Previous pain level was  4/10.   Changes in function:  Yes (See Goal flowsheet attached for changes in current functional level)  Adverse reaction to treatment or activity: None    OBJECTIVE  Changes noted in objective findings: L posterior tib 5/5, peroneals 5-/5.Improved FHL activation to neutral. SL balance > 30 sec on L (incr trunk sway)     ASSESSMENT/PLAN  Diagnosis: s/p L foot   Updated problem list and treatment plan:   Pain - HEP  Decreased ROM/flexibility - HEP  Decreased strength - HEP  Impaired muscle performance - HEP  Decreased joint mobility - HEP  STG/LTGs have been met or progress has been made towards goals:  Yes, please see goal flowsheet for most current information  Assessment of Progress: current status is unknown.    Last current status: Pt is progressing as expected   Self Management Plans:  HEP  I have re-evaluated this patient and find that the nature, scope, duration and intensity of the therapy is appropriate for the medical condition of the patient.  Karrie continues to require the following intervention to meet STG and LTG's:  HEP.    Recommendations:  Discharge with current home program.  Patient to follow up with MD as needed.    Please refer to the daily flowsheet for treatment today, total treatment time and time spent performing 1:1 timed codes.

## 2019-06-07 PROBLEM — Z98.890 S/P FOOT SURGERY: Status: RESOLVED | Noted: 2017-12-14 | Resolved: 2019-06-07

## 2019-06-07 PROBLEM — M79.672 LEFT FOOT PAIN: Status: RESOLVED | Noted: 2017-12-14 | Resolved: 2019-06-07

## 2020-05-27 ENCOUNTER — NURSE TRIAGE (OUTPATIENT)
Dept: NURSING | Facility: CLINIC | Age: 70
End: 2020-05-27

## 2020-05-27 DIAGNOSIS — Z20.822 EXPOSURE TO COVID-19 VIRUS: Primary | ICD-10-CM

## 2020-05-28 DIAGNOSIS — Z20.822 EXPOSURE TO COVID-19 VIRUS: ICD-10-CM

## 2020-05-28 PROCEDURE — 86769 SARS-COV-2 COVID-19 ANTIBODY: CPT | Mod: 90 | Performed by: EMERGENCY MEDICINE

## 2020-05-28 PROCEDURE — 99000 SPECIMEN HANDLING OFFICE-LAB: CPT | Performed by: EMERGENCY MEDICINE

## 2020-05-28 PROCEDURE — 36415 COLL VENOUS BLD VENIPUNCTURE: CPT | Performed by: EMERGENCY MEDICINE

## 2020-05-28 NOTE — TELEPHONE ENCOUNTER
"Patient is calling requesting COVID serologic antibody testing.  NOTE: Serologic testing is a blood test for 'antibodies' which are made at 10-14 days after you have had symptoms of COVID or were exposed and had an asymptomatic infection.  This does NOT test you for 'active' infection or tell you if you are contagious.    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  Do you currently have a cough, fever, body aches, shortness of breath, or difficulty breathing?  No  Did you previously have cough, fever, body aches, shortness of breath, or difficulty breathing that have now resolved? Has had previous covid symptoms. Had cough only. Currently Asymptomatic.     Symptoms began: 5 weeks ago.    Do You work for [facility]: NO    The patient was informed: \"Testing is limited each day and it may take time for testing to be available to everyone who has called. You will receive a call within 48-72 hours to schedule the serology testing. Please confirm the best number to reach you is [phone]. If you have any questions about scheduling, call [phone].\"       RN ordered COVID-19 Antibody test for patient.       Raymundo Stapleotn RN  [facility]                     "

## 2020-06-02 LAB
COVID-19 SPIKE RBD ABY TITER: NORMAL
COVID-19 SPIKE RBD ABY: NEGATIVE

## 2021-01-14 ENCOUNTER — HEALTH MAINTENANCE LETTER (OUTPATIENT)
Age: 71
End: 2021-01-14

## 2021-10-23 ENCOUNTER — HEALTH MAINTENANCE LETTER (OUTPATIENT)
Age: 71
End: 2021-10-23

## 2022-02-12 ENCOUNTER — HEALTH MAINTENANCE LETTER (OUTPATIENT)
Age: 72
End: 2022-02-12

## 2022-10-10 ENCOUNTER — HEALTH MAINTENANCE LETTER (OUTPATIENT)
Age: 72
End: 2022-10-10

## 2023-03-25 ENCOUNTER — HEALTH MAINTENANCE LETTER (OUTPATIENT)
Age: 73
End: 2023-03-25

## 2024-07-31 ENCOUNTER — TELEPHONE (OUTPATIENT)
Dept: NEUROLOGY | Facility: CLINIC | Age: 74
End: 2024-07-31

## 2024-07-31 NOTE — TELEPHONE ENCOUNTER
Please call and schedule this patient on 8/16/24 ERIBERTO slot, with Dr Sammi Webb, either 10:00am, or 11:00am 60 minute New consult for [facility]. VIP patient, request from Service Line Chief, Dr. Jodi Junior. Please let me know if you have any questions.     Thank you for your help!   Arash Espana, Neurosciences Service

## 2024-08-07 ENCOUNTER — OFFICE VISIT (OUTPATIENT)
Dept: URGENT CARE | Facility: URGENT CARE | Age: 74
End: 2024-08-07
Payer: MEDICARE

## 2024-08-07 VITALS
TEMPERATURE: 97.5 F | OXYGEN SATURATION: 96 % | BODY MASS INDEX: 24.32 KG/M2 | SYSTOLIC BLOOD PRESSURE: 118 MMHG | DIASTOLIC BLOOD PRESSURE: 66 MMHG | HEIGHT: 65 IN | WEIGHT: 146 LBS | RESPIRATION RATE: 16 BRPM | HEART RATE: 76 BPM

## 2024-08-07 DIAGNOSIS — S81.811D LACERATION OF RIGHT LOWER LEG WITH COMPLICATION, SUBSEQUENT ENCOUNTER: ICD-10-CM

## 2024-08-07 DIAGNOSIS — L03.115 CELLULITIS OF RIGHT LEG: Primary | ICD-10-CM

## 2024-08-07 PROCEDURE — 87186 SC STD MICRODIL/AGAR DIL: CPT | Mod: 59 | Performed by: NURSE PRACTITIONER

## 2024-08-07 PROCEDURE — 87070 CULTURE OTHR SPECIMN AEROBIC: CPT | Performed by: NURSE PRACTITIONER

## 2024-08-07 PROCEDURE — 99204 OFFICE O/P NEW MOD 45 MIN: CPT | Performed by: NURSE PRACTITIONER

## 2024-08-07 PROCEDURE — 87077 CULTURE AEROBIC IDENTIFY: CPT | Performed by: NURSE PRACTITIONER

## 2024-08-07 RX ORDER — ATORVASTATIN CALCIUM 40 MG/1
40 TABLET, FILM COATED ORAL DAILY
COMMUNITY

## 2024-08-07 RX ORDER — CEPHALEXIN 500 MG/1
500 CAPSULE ORAL 4 TIMES DAILY
Qty: 40 CAPSULE | Refills: 0 | Status: ON HOLD | OUTPATIENT
Start: 2024-08-07 | End: 2024-08-12

## 2024-08-07 NOTE — PATIENT INSTRUCTIONS
Keflex for RLE cellulitis laceration infection  Epsom salt warm soapy hibiclens compress and bacitracin zinc daily  Improvement should be seen in 2-3 days  Reevaluation in the ER if increased redness warmth pus streaking fevers vomiting malaise

## 2024-08-07 NOTE — PROGRESS NOTES
Chief Complaint   Patient presents with    Urgent Care     Cut to right lower leg on copper pipe on 7/31, seen at [facility], cut was down to tendon. Concern of infection.      SUBJECTIVE:  Karrie Sheriff is a 74 year old female presenting with an infected right lower extremity laceration that was sutured a week ago at [facility] ER 7/31/2024.  In the last day or so has noted increased redness warmth swelling yellow purulent drainage.  Has been keeping it clean at home.  Starting to feel slightly nauseous.  No true fevers vomiting red streaking.  Kidney function within normal limits 3 months prior. Nondiabetic.    Past Medical History:   Diagnosis Date    Bipolar I disorder, most recent episode (or current) unspecified (HCC)     Cataracts, both eyes     Cervicalgia     Difficult intubation     Edema     Genital herpes in women     Malignant hyperthermia     Other nonspecific findings on examination of blood(790.99)     PONV (postoperative nausea and vomiting)     Presbyopia     Pure hypercholesterolemia     Renal disease     RECURRENT UTI    Sciatica     Unspecified asthma(493.90)     Unspecified hypothyroidism     Unspecified hypothyroidism      Current Outpatient Medications   Medication Sig Dispense Refill    calcium-vitamin D (CALTRATE) 600-400 MG-UNIT per tablet Take 1 tablet by mouth daily      cephALEXin (KEFLEX) 500 MG capsule Take 1 capsule (500 mg) by mouth 4 times daily for 10 days 40 capsule 0    estradiol (VAGIFEM) 10 MCG TABS Place 10 mcg vaginally twice a week      HYDROCHLOROTHIAZIDE PO Take 50 mg by mouth daily       LamoTRIgine (LAMICTAL PO) Take 100mg PO in morning and 400mg in the evening.      Levothyroxine Sodium (SYNTHROID PO) Take 112 mcg by mouth.      Omega-3 Fatty Acids (OMEGA-3 FISH OIL PO) Take 1 g by mouth 2 times daily       ValACYclovir HCl (VALTREX PO) Take 1,000 mg by mouth 2 times daily       albuterol (PROAIR HFA, PROVENTIL HFA, VENTOLIN HFA) 108 (90 BASE) MCG/ACT inhaler Inhale 2 puffs  "into the lungs every 4 hours as needed (Patient not taking: Reported on 8/7/2024)      Armodafinil (NUVIGIL PO) Take 150 mg by mouth daily (Patient not taking: Reported on 8/7/2024)      atorvastatin (LIPITOR) 40 MG tablet Take 40 mg by mouth daily      CIPROFLOXACIN PO Take 250 mg by mouth 2 times daily as needed      Fluconazole (DIFLUCAN PO) Take 150 mg by mouth as needed       lithium (ESKALITH/LITHOBID) 300 MG CR tablet Take 300 mg by mouth every evening (Patient not taking: Reported on 8/7/2024)      NITROFURANTOIN MONOHYD MACRO PO Take 100 mg by mouth as needed       RISEDRONATE SODIUM PO  (Patient not taking: Reported on 8/7/2024)      RISPERIDONE PO Take 1 mg by mouth 2 times daily (Patient not taking: Reported on 8/7/2024)      Simvastatin (ZOCOR PO) Take 20 mg by mouth At Bedtime  (Patient not taking: Reported on 8/7/2024)      Topiramate (TOPAMAX PO) Take 50 mg by mouth At Bedtime  (Patient not taking: Reported on 8/7/2024)      Zolpidem Tartrate (AMBIEN PO) Take 2.5-5 mg by mouth nightly as needed        No current facility-administered medications for this visit.     Social History     Tobacco Use    Smoking status: Never    Smokeless tobacco: Not on file   Substance Use Topics    Alcohol use: Yes     Comment: OCCAS.     Allergies   Allergen Reactions    Zomepirac      ANGIOEDEMA         Review of Systems  All systems negative except for those listed above in HPI    OBJECTIVE:   /66   Pulse 76   Temp 97.5  F (36.4  C) (Temporal)   Resp 16   Ht 1.651 m (5' 5\")   Wt 66.2 kg (146 lb)   SpO2 96%   BMI 24.30 kg/m    Physical Exam  Vitals reviewed.   Constitutional:       Appearance: Normal appearance.   HENT:      Head: Normocephalic and atraumatic.   Cardiovascular:      Rate and Rhythm: Normal rate.      Pulses: Normal pulses.   Pulmonary:      Effort: Pulmonary effort is normal.   Musculoskeletal:         General: Tenderness and signs of injury present. Normal range of motion.        " Legs:    Skin:     General: Skin is warm and dry.      Findings: Erythema present. No rash.   Neurological:      General: No focal deficit present.      Mental Status: She is alert and oriented to person, place, and time.   Psychiatric:         Mood and Affect: Mood normal.         Behavior: Behavior normal.       ASSESSMENT:    ICD-10-CM    1. Cellulitis of right leg  L03.115 cephALEXin (KEFLEX) 500 MG capsule     Swab Aerobic Bacterial Culture Routine Without Gram Stain      2. Laceration of right lower leg with complication, subsequent encounter  S81.949U         PLAN:     Keflex for RLE cellulitis laceration infection  Epsom salt warm soapy hibiclens compress and bacitracin zinc daily  Improvement should be seen in 2-3 days  Reevaluation in the ER if increased redness warmth pus streaking fevers vomiting malaise  Reviewed tetanus is up-to-date and sutures out on day 10-14 per ER    Follow up with primary care provider with any problems, questions or concerns or if symptoms worsen or fail to improve. Patient agreed to plan and verbalized understanding.    Mitali Ma, KIMBERLY-BC  [facility] URGENT CARE [place]

## 2024-08-10 ENCOUNTER — NURSE TRIAGE (OUTPATIENT)
Dept: NURSING | Facility: CLINIC | Age: 74
End: 2024-08-10
Payer: MEDICARE

## 2024-08-10 DIAGNOSIS — L03.115 CELLULITIS OF RIGHT LOWER EXTREMITY: Primary | ICD-10-CM

## 2024-08-10 LAB
BACTERIA WND CULT: ABNORMAL
BACTERIA WND CULT: ABNORMAL

## 2024-08-10 RX ORDER — CIPROFLOXACIN 750 MG/1
750 TABLET, FILM COATED ORAL 2 TIMES DAILY
Qty: 14 TABLET | Refills: 0 | Status: SHIPPED | OUTPATIENT
Start: 2024-08-10 | End: 2024-08-17

## 2024-08-10 NOTE — TELEPHONE ENCOUNTER
Seen in  a few days ago.  Cellulitis.  Put on Keflex.  Infection looks worse.    Is in [place].  Asked to speak to a doctor.  Her pcp is with [facility].  I can't page a provider for her.  She wants to return home.    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    She's unsure what she is going to do.  Adrianne GAMINO RN [facility]

## 2024-08-11 ENCOUNTER — HOSPITAL ENCOUNTER (INPATIENT)
Facility: CLINIC | Age: 74
LOS: 1 days | Discharge: HOME | DRG: 863 | End: 2024-08-12
Attending: EMERGENCY MEDICINE | Admitting: HOSPITALIST
Payer: MEDICARE

## 2024-08-11 DIAGNOSIS — T14.8XXA WOUND INFECTION: ICD-10-CM

## 2024-08-11 DIAGNOSIS — R11.0 NAUSEA: Primary | ICD-10-CM

## 2024-08-11 DIAGNOSIS — L08.9 WOUND INFECTION: ICD-10-CM

## 2024-08-11 LAB
ANION GAP SERPL CALCULATED.3IONS-SCNC: 9 MMOL/L (ref 7–15)
BASOPHILS # BLD AUTO: 0.1 10E3/UL (ref 0–0.2)
BASOPHILS NFR BLD AUTO: 1 %
BUN SERPL-MCNC: 11.4 MG/DL (ref 8–23)
CALCIUM SERPL-MCNC: 9.3 MG/DL (ref 8.8–10.4)
CHLORIDE SERPL-SCNC: 96 MMOL/L (ref 98–107)
CREAT SERPL-MCNC: 0.97 MG/DL (ref 0.51–0.95)
EGFRCR SERPLBLD CKD-EPI 2021: 62 ML/MIN/1.73M2
EOSINOPHIL # BLD AUTO: 0.5 10E3/UL (ref 0–0.7)
EOSINOPHIL NFR BLD AUTO: 7 %
ERYTHROCYTE [DISTWIDTH] IN BLOOD BY AUTOMATED COUNT: 11.9 % (ref 10–15)
GLUCOSE SERPL-MCNC: 107 MG/DL (ref 70–99)
HCO3 SERPL-SCNC: 28 MMOL/L (ref 22–29)
HCT VFR BLD AUTO: 35.2 % (ref 35–47)
HGB BLD-MCNC: 12.4 G/DL (ref 11.7–15.7)
IMM GRANULOCYTES # BLD: 0 10E3/UL
IMM GRANULOCYTES NFR BLD: 0 %
LACTATE SERPL-SCNC: 1 MMOL/L (ref 0.7–2)
LYMPHOCYTES # BLD AUTO: 1.9 10E3/UL (ref 0.8–5.3)
LYMPHOCYTES NFR BLD AUTO: 29 %
MCH RBC QN AUTO: 36.8 PG (ref 26.5–33)
MCHC RBC AUTO-ENTMCNC: 35.2 G/DL (ref 31.5–36.5)
MCV RBC AUTO: 105 FL (ref 78–100)
MONOCYTES # BLD AUTO: 0.5 10E3/UL (ref 0–1.3)
MONOCYTES NFR BLD AUTO: 7 %
NEUTROPHILS # BLD AUTO: 3.6 10E3/UL (ref 1.6–8.3)
NEUTROPHILS NFR BLD AUTO: 56 %
NRBC # BLD AUTO: 0 10E3/UL
NRBC BLD AUTO-RTO: 0 /100
PLATELET # BLD AUTO: 244 10E3/UL (ref 150–450)
POTASSIUM SERPL-SCNC: 4.1 MMOL/L (ref 3.4–5.3)
RBC # BLD AUTO: 3.37 10E6/UL (ref 3.8–5.2)
SODIUM SERPL-SCNC: 133 MMOL/L (ref 135–145)
WBC # BLD AUTO: 6.5 10E3/UL (ref 4–11)

## 2024-08-11 PROCEDURE — 85025 COMPLETE CBC W/AUTO DIFF WBC: CPT | Performed by: EMERGENCY MEDICINE

## 2024-08-11 PROCEDURE — 99223 1ST HOSP IP/OBS HIGH 75: CPT | Mod: AI | Performed by: HOSPITALIST

## 2024-08-11 PROCEDURE — G0378 HOSPITAL OBSERVATION PER HR: HCPCS

## 2024-08-11 PROCEDURE — 36415 COLL VENOUS BLD VENIPUNCTURE: CPT | Performed by: EMERGENCY MEDICINE

## 2024-08-11 PROCEDURE — 99285 EMERGENCY DEPT VISIT HI MDM: CPT | Mod: 25

## 2024-08-11 PROCEDURE — 96365 THER/PROPH/DIAG IV INF INIT: CPT

## 2024-08-11 PROCEDURE — 10060 I&D ABSCESS SIMPLE/SINGLE: CPT

## 2024-08-11 PROCEDURE — 96376 TX/PRO/DX INJ SAME DRUG ADON: CPT

## 2024-08-11 PROCEDURE — 250N000011 HC RX IP 250 OP 636: Performed by: HOSPITALIST

## 2024-08-11 PROCEDURE — 96375 TX/PRO/DX INJ NEW DRUG ADDON: CPT

## 2024-08-11 PROCEDURE — 80048 BASIC METABOLIC PNL TOTAL CA: CPT | Performed by: EMERGENCY MEDICINE

## 2024-08-11 PROCEDURE — 250N000011 HC RX IP 250 OP 636: Performed by: EMERGENCY MEDICINE

## 2024-08-11 PROCEDURE — 83605 ASSAY OF LACTIC ACID: CPT | Performed by: EMERGENCY MEDICINE

## 2024-08-11 PROCEDURE — 250N000013 HC RX MED GY IP 250 OP 250 PS 637: Performed by: HOSPITALIST

## 2024-08-11 RX ORDER — PIPERACILLIN SODIUM, TAZOBACTAM SODIUM 3; .375 G/15ML; G/15ML
3.38 INJECTION, POWDER, LYOPHILIZED, FOR SOLUTION INTRAVENOUS EVERY 6 HOURS
Status: DISCONTINUED | OUTPATIENT
Start: 2024-08-11 | End: 2024-08-12 | Stop reason: HOSPADM

## 2024-08-11 RX ORDER — LAMOTRIGINE 200 MG/1
TABLET ORAL 2 TIMES DAILY
COMMUNITY

## 2024-08-11 RX ORDER — HYDROCHLOROTHIAZIDE 50 MG/1
50 TABLET ORAL DAILY
COMMUNITY

## 2024-08-11 RX ORDER — PROCHLORPERAZINE 25 MG
12.5 SUPPOSITORY, RECTAL RECTAL EVERY 12 HOURS PRN
Status: DISCONTINUED | OUTPATIENT
Start: 2024-08-11 | End: 2024-08-12 | Stop reason: HOSPADM

## 2024-08-11 RX ORDER — NITROFURANTOIN MACROCRYSTAL 100 MG
100 CAPSULE ORAL DAILY PRN
COMMUNITY

## 2024-08-11 RX ORDER — ACETAMINOPHEN 650 MG/1
650 SUPPOSITORY RECTAL EVERY 4 HOURS PRN
Status: DISCONTINUED | OUTPATIENT
Start: 2024-08-11 | End: 2024-08-12 | Stop reason: HOSPADM

## 2024-08-11 RX ORDER — SULFAMETHOXAZOLE/TRIMETHOPRIM 800-160 MG
2 TABLET ORAL 2 TIMES DAILY
COMMUNITY
Start: 2024-08-08

## 2024-08-11 RX ORDER — OXYCODONE HYDROCHLORIDE 5 MG/1
5 TABLET ORAL EVERY 4 HOURS PRN
Status: DISCONTINUED | OUTPATIENT
Start: 2024-08-11 | End: 2024-08-12 | Stop reason: HOSPADM

## 2024-08-11 RX ORDER — PANTOPRAZOLE SODIUM 40 MG/1
40 TABLET, DELAYED RELEASE ORAL DAILY
Status: DISCONTINUED | OUTPATIENT
Start: 2024-08-12 | End: 2024-08-12 | Stop reason: HOSPADM

## 2024-08-11 RX ORDER — FLUCONAZOLE 150 MG/1
150 TABLET ORAL
COMMUNITY

## 2024-08-11 RX ORDER — PROCHLORPERAZINE MALEATE 5 MG
5 TABLET ORAL EVERY 6 HOURS PRN
Status: DISCONTINUED | OUTPATIENT
Start: 2024-08-11 | End: 2024-08-12 | Stop reason: HOSPADM

## 2024-08-11 RX ORDER — ONDANSETRON 4 MG/1
4 TABLET, ORALLY DISINTEGRATING ORAL EVERY 6 HOURS PRN
Status: DISCONTINUED | OUTPATIENT
Start: 2024-08-11 | End: 2024-08-12 | Stop reason: HOSPADM

## 2024-08-11 RX ORDER — HYDROCHLOROTHIAZIDE 25 MG/1
50 TABLET ORAL DAILY
Status: DISCONTINUED | OUTPATIENT
Start: 2024-08-12 | End: 2024-08-12 | Stop reason: HOSPADM

## 2024-08-11 RX ORDER — ONDANSETRON 2 MG/ML
4 INJECTION INTRAMUSCULAR; INTRAVENOUS EVERY 6 HOURS PRN
Status: DISCONTINUED | OUTPATIENT
Start: 2024-08-11 | End: 2024-08-12 | Stop reason: HOSPADM

## 2024-08-11 RX ORDER — PIPERACILLIN SODIUM, TAZOBACTAM SODIUM 4; .5 G/20ML; G/20ML
4.5 INJECTION, POWDER, LYOPHILIZED, FOR SOLUTION INTRAVENOUS ONCE
Status: COMPLETED | OUTPATIENT
Start: 2024-08-11 | End: 2024-08-11

## 2024-08-11 RX ORDER — GINSENG 100 MG
CAPSULE ORAL DAILY
COMMUNITY

## 2024-08-11 RX ORDER — ZOLPIDEM TARTRATE 5 MG/1
5 TABLET ORAL
Status: DISCONTINUED | OUTPATIENT
Start: 2024-08-11 | End: 2024-08-12 | Stop reason: HOSPADM

## 2024-08-11 RX ORDER — ZOLPIDEM TARTRATE 5 MG/1
2.5-5 TABLET ORAL
COMMUNITY

## 2024-08-11 RX ORDER — AMOXICILLIN 250 MG
1 CAPSULE ORAL 2 TIMES DAILY PRN
Status: DISCONTINUED | OUTPATIENT
Start: 2024-08-11 | End: 2024-08-12 | Stop reason: HOSPADM

## 2024-08-11 RX ORDER — ACETAMINOPHEN 325 MG/1
650 TABLET ORAL EVERY 4 HOURS PRN
Status: DISCONTINUED | OUTPATIENT
Start: 2024-08-11 | End: 2024-08-12 | Stop reason: HOSPADM

## 2024-08-11 RX ORDER — GINSENG 100 MG
CAPSULE ORAL DAILY
Status: DISCONTINUED | OUTPATIENT
Start: 2024-08-12 | End: 2024-08-12 | Stop reason: HOSPADM

## 2024-08-11 RX ORDER — CIPROFLOXACIN 250 MG/1
250 TABLET, FILM COATED ORAL 2 TIMES DAILY PRN
COMMUNITY

## 2024-08-11 RX ORDER — LAMOTRIGINE 100 MG/1
100 TABLET ORAL DAILY
Status: DISCONTINUED | OUTPATIENT
Start: 2024-08-12 | End: 2024-08-12 | Stop reason: HOSPADM

## 2024-08-11 RX ORDER — NALOXONE HYDROCHLORIDE 0.4 MG/ML
0.4 INJECTION, SOLUTION INTRAMUSCULAR; INTRAVENOUS; SUBCUTANEOUS
Status: DISCONTINUED | OUTPATIENT
Start: 2024-08-11 | End: 2024-08-12 | Stop reason: HOSPADM

## 2024-08-11 RX ORDER — VALACYCLOVIR HYDROCHLORIDE 1 G/1
1000 TABLET, FILM COATED ORAL 2 TIMES DAILY
COMMUNITY

## 2024-08-11 RX ORDER — LEVOTHYROXINE SODIUM 100 UG/1
100 TABLET ORAL
COMMUNITY

## 2024-08-11 RX ORDER — HYDROMORPHONE HCL IN WATER/PF 6 MG/30 ML
0.2 PATIENT CONTROLLED ANALGESIA SYRINGE INTRAVENOUS ONCE
Status: COMPLETED | OUTPATIENT
Start: 2024-08-11 | End: 2024-08-11

## 2024-08-11 RX ORDER — NALOXONE HYDROCHLORIDE 0.4 MG/ML
0.2 INJECTION, SOLUTION INTRAMUSCULAR; INTRAVENOUS; SUBCUTANEOUS
Status: DISCONTINUED | OUTPATIENT
Start: 2024-08-11 | End: 2024-08-12 | Stop reason: HOSPADM

## 2024-08-11 RX ORDER — POLYETHYLENE GLYCOL 3350 17 G/17G
17 POWDER, FOR SOLUTION ORAL 2 TIMES DAILY PRN
Status: DISCONTINUED | OUTPATIENT
Start: 2024-08-11 | End: 2024-08-12 | Stop reason: HOSPADM

## 2024-08-11 RX ORDER — ONDANSETRON 2 MG/ML
4 INJECTION INTRAMUSCULAR; INTRAVENOUS ONCE
Status: COMPLETED | OUTPATIENT
Start: 2024-08-11 | End: 2024-08-11

## 2024-08-11 RX ORDER — AMOXICILLIN 250 MG
2 CAPSULE ORAL 2 TIMES DAILY PRN
Status: DISCONTINUED | OUTPATIENT
Start: 2024-08-11 | End: 2024-08-12 | Stop reason: HOSPADM

## 2024-08-11 RX ORDER — VALACYCLOVIR HYDROCHLORIDE 1 G/1
1000 TABLET, FILM COATED ORAL 2 TIMES DAILY
Status: DISCONTINUED | OUTPATIENT
Start: 2024-08-11 | End: 2024-08-12 | Stop reason: HOSPADM

## 2024-08-11 RX ADMIN — LAMOTRIGINE 400 MG: 100 TABLET ORAL at 19:47

## 2024-08-11 RX ADMIN — VALACYCLOVIR HYDROCHLORIDE 1000 MG: 1 TABLET, FILM COATED ORAL at 19:47

## 2024-08-11 RX ADMIN — PIPERACILLIN AND TAZOBACTAM 4.5 G: 4; .5 INJECTION, POWDER, FOR SOLUTION INTRAVENOUS at 17:15

## 2024-08-11 RX ADMIN — ZOLPIDEM TARTRATE 5 MG: 5 TABLET ORAL at 23:42

## 2024-08-11 RX ADMIN — HYDROMORPHONE HYDROCHLORIDE 0.2 MG: 0.2 INJECTION, SOLUTION INTRAMUSCULAR; INTRAVENOUS; SUBCUTANEOUS at 15:48

## 2024-08-11 RX ADMIN — ONDANSETRON 4 MG: 2 INJECTION INTRAMUSCULAR; INTRAVENOUS at 15:48

## 2024-08-11 RX ADMIN — PIPERACILLIN AND TAZOBACTAM 3.38 G: 3; .375 INJECTION, POWDER, FOR SOLUTION INTRAVENOUS at 22:56

## 2024-08-11 RX ADMIN — MAGNESIUM HYDROXIDE 15 ML: 400 SUSPENSION ORAL at 19:47

## 2024-08-11 ASSESSMENT — COLUMBIA-SUICIDE SEVERITY RATING SCALE - C-SSRS
1. IN THE PAST MONTH, HAVE YOU WISHED YOU WERE DEAD OR WISHED YOU COULD GO TO SLEEP AND NOT WAKE UP?: NO
2. HAVE YOU ACTUALLY HAD ANY THOUGHTS OF KILLING YOURSELF IN THE PAST MONTH?: NO
6. HAVE YOU EVER DONE ANYTHING, STARTED TO DO ANYTHING, OR PREPARED TO DO ANYTHING TO END YOUR LIFE?: NO

## 2024-08-11 ASSESSMENT — ACTIVITIES OF DAILY LIVING (ADL)
ADLS_ACUITY_SCORE: 35
ADLS_ACUITY_SCORE: 31
ADLS_ACUITY_SCORE: 35
ADLS_ACUITY_SCORE: 35
ADLS_ACUITY_SCORE: 32
ADLS_ACUITY_SCORE: 31
ADLS_ACUITY_SCORE: 31
ADLS_ACUITY_SCORE: 35

## 2024-08-11 NOTE — PHARMACY-ADMISSION MEDICATION HISTORY
Pharmacist Admission Medication History    Admission medication history is complete. The information provided in this note is only as accurate as the sources available at the time of the update.    Information Source(s): Patient and CareEverywhere/SureScripts via in-person    Pertinent Information: none    Changes made to PTA medication list:  Added: MOM, vit D, omeprazole, septra DS  Deleted: nuvigil, lithium, risendronate, simvastatin, topiramate  Changed: levothyroxine dose, Otis/vitD from daily  to BID    Allergies reviewed with patient and updates made in EHR: yes    Medication History Completed By: Chula Mackey [facility] 8/11/2024 4:54 PM    PTA Med List   Medication Sig Last Dose    atorvastatin (LIPITOR) 40 MG tablet Take 40 mg by mouth daily 8/10/2024 at HS    bacitracin 500 UNIT/GM OINT Apply topically daily 8/11/2024    calcium-vitamin D (CALTRATE) 600-400 MG-UNIT per tablet Take 1 tablet by mouth 2 times daily 8/11/2024 at am    cephALEXin (KEFLEX) 500 MG capsule Take 1 capsule (500 mg) by mouth 4 times daily for 10 days Past Week    cholecalciferol (VITAMIN D3) 125 mcg (5000 units) capsule Take 125 mcg by mouth daily 8/11/2024 at am    ciprofloxacin (CIPRO) 750 MG tablet Take 1 tablet (750 mg) by mouth 2 times daily for 7 days 8/11/2024 at am    estradiol (VAGIFEM) 10 MCG TABS Place 10 mcg vaginally twice a week Past Week    hydrochlorothiazide (HYDRODIURIL) 50 MG tablet Take 50 mg by mouth daily 8/11/2024 at am    lamoTRIgine (LAMICTAL) 200 MG tablet Take by mouth 2 times daily Take 1/2 tablet (100mg) in the morning and 2 tablets (400mg) in the evening 8/11/2024 at am    levothyroxine (SYNTHROID/LEVOTHROID) 100 MCG tablet Take 100 mcg by mouth every morning (before breakfast) 8/11/2024 at am    magnesium hydroxide (MILK OF MAGNESIA) 400 MG/5ML suspension Take 15 mLs by mouth at bedtime 8/10/2024 at pm    Omega-3 Fatty Acids (OMEGA-3 FISH OIL PO) Take 1 g by mouth 2 times daily  8/11/2024 at am    omeprazole  (PRILOSEC) 20 MG DR capsule Take 20 mg by mouth daily 8/11/2024 at am    sulfamethoxazole-trimethoprim (BACTRIM DS) 800-160 MG tablet Take 2 tablets by mouth 2 times daily X 10 days 8/11/2024 at am    valACYclovir (VALTREX) 1000 mg tablet Take 1,000 mg by mouth 2 times daily 8/11/2024 at am

## 2024-08-11 NOTE — PROGRESS NOTES
RECEIVING UNIT ED HANDOFF REVIEW    ED Nurse Handoff Report was reviewed by: Nyasia Estevez RN on August 11, 2024 at 6:36 PM

## 2024-08-11 NOTE — ED NOTES
"[facility]  ED Nurse Handoff Report    ED Chief complaint: Wound Check      ED Diagnosis:   Final diagnoses:   Wound infection       Code Status: Full Code    Allergies:   Allergies   Allergen Reactions    Zomepirac      ANGIOEDEMA      Latex Rash     rash       Patient Story: wound check  Focused Assessment:  Patient with non healing right shin wound, will be admitted for further observation and treatment    Treatments and/or interventions provided: See MAR  Patient's response to treatments and/or interventions: TBD    To be done/followed up on inpatient unit:  close monitoring    Does this patient have any cognitive concerns?:  na    Activity level - Baseline/Home:  Independent  Activity Level - Current:   Independent    Patient's Preferred language: English   Needed?: No    Isolation: None  Infection: Not Applicable  na  Patient tested for COVID 19 prior to admission: NO  Bariatric?: No    Vital Signs:   Vitals:    08/11/24 1356   BP: (!) 146/50   Pulse: 85   Resp: 16   Temp: 97.7  F (36.5  C)   TempSrc: Temporal   SpO2: 99%   Weight: 67.1 kg (148 lb)   Height: 1.651 m (5' 5\")       Cardiac Rhythm:     Was the PSS-3 completed:   Yes  What interventions are required if any?               Family Comments: daughter at bedside  OBS brochure/video discussed/provided to patient/family: Yes              Name of person given brochure if not patient: na              Relationship to patient: na    For the majority of the shift this patient's behavior was Green.   Behavioral interventions performed were none.    ED NURSE PHONE NUMBER: *[phone]         "

## 2024-08-11 NOTE — ED PROVIDER NOTES
Emergency Department Note      History of Present Illness     Chief Complaint   Wound Check    HPI   Karrie Sheriff is a 74 year old female with a history of renal disease and hypercholesterolemia who presents to the ED for wound check. She explains that on July 31st she suffered a laceration to her right calf that was repaired the same day at [facility]. Shortly afterward she developed redness around the wound site. On 8/7 she visited urgent care and was started on Keflex though her redness persisted. Two days ago she was placed on Cipro and Bactrim. She presents today cause the redness has continued and the wound itself reportedly looks worse. She denies fevers. No immunosuppression or use of blood thinning medication.     Independent Historian   None    Review of External Notes   Reviewed 8/7 clinic visit. Started on Keflex. Started on Cipro yesterday.    Past Medical History     Medical History and Problem List   Past Medical History:   Diagnosis Date    Bipolar I disorder, most recent episode (or current) unspecified (HCC)     Cataracts, both eyes     Cervicalgia     Difficult intubation     Edema     Genital herpes in women     Malignant hyperthermia     Other nonspecific findings on examination of blood(790.99)     PONV (postoperative nausea and vomiting)     Presbyopia     Pure hypercholesterolemia     Renal disease     Sciatica     Unspecified asthma(493.90)     Unspecified hypothyroidism     Unspecified hypothyroidism      Medications   atorvastatin (LIPITOR) 40 MG tablet  bacitracin 500 UNIT/GM OINT  calcium-vitamin D (CALTRATE) 600-400 MG-UNIT per tablet  cephALEXin (KEFLEX) 500 MG capsule  cholecalciferol (VITAMIN D3) 125 mcg (5000 units) capsule  ciprofloxacin (CIPRO) 750 MG tablet  estradiol (VAGIFEM) 10 MCG TABS  hydrochlorothiazide (HYDRODIURIL) 50 MG tablet  lamoTRIgine (LAMICTAL) 200 MG tablet  levothyroxine (SYNTHROID/LEVOTHROID) 100 MCG tablet  magnesium hydroxide (MILK OF MAGNESIA) 400  "MG/5ML suspension  Omega-3 Fatty Acids (OMEGA-3 FISH OIL PO)  omeprazole (PRILOSEC) 20 MG DR capsule  sulfamethoxazole-trimethoprim (BACTRIM DS) 800-160 MG tablet  valACYclovir (VALTREX) 1000 mg tablet  albuterol (PROAIR HFA, PROVENTIL HFA, VENTOLIN HFA) 108 (90 BASE) MCG/ACT inhaler  ciprofloxacin (CIPRO) 250 MG tablet  fluconazole (DIFLUCAN) 150 MG tablet  nitroFURantoin macrocrystal (MACRODANTIN) 100 MG capsule  zolpidem (AMBIEN) 5 MG tablet        Surgical History   Past Surgical History:   Procedure Laterality Date    ARTHROTOMY SHOULDER, SUBACROMIAL DECOMPRESSION, COMBINED      BLEPHAROPLASTY      COLONOSCOPY      D & C      BURGOS'S NEUROMA[      ORTHOPEDIC SURGERY      OSTEOTOMY FOOT  1/16/2013    Procedure: OSTEOTOMY FOOT;  REVISION RIGHT BUNION REPAIR; REVISION 2ND/3RD METATARSAL WEIL OSTEOTOMIES (C-ARM) (LATEX SENSITIVITY);  Surgeon: Marin Rosa MD;  Location: [facility]    TUBAL LIGATION         Physical Exam     Patient Vitals for the past 24 hrs:   BP Temp Temp src Pulse Resp SpO2 Height Weight   08/11/24 1356 (!) 146/50 97.7  F (36.5  C) Temporal 85 16 99 % 1.651 m (5' 5\") 67.1 kg (148 lb)     Physical Exam  VS: Reviewed per above  HENT: Mucous membranes moist  EYES: sclera anicteric  CV: Rate as noted,  regular rhythm.   RESP: Effort normal. Breath sounds are normal bilaterally.  NEURO: Alert, moving all extremities  MSK: No deformity of the extremities  SKIN: Warm and dry.  V-shaped wound to the right shin per images below with surrounding erythema.  There are some areas of possible necrotic tissue at the apex of the wound.  20 sutures were initially in place but removed before taking picture below.      Diagnostics     Lab Results   Labs Ordered and Resulted from Time of ED Arrival to Time of ED Departure   BASIC METABOLIC PANEL - Abnormal       Result Value    Sodium 133 (*)     Potassium 4.1      Chloride 96 (*)     Carbon Dioxide (CO2) 28      Anion Gap 9      Urea Nitrogen 11.4      " Creatinine 0.97 (*)     GFR Estimate 62      Calcium 9.3      Glucose 107 (*)    CBC WITH PLATELETS AND DIFFERENTIAL - Abnormal    WBC Count 6.5      RBC Count 3.37 (*)     Hemoglobin 12.4      Hematocrit 35.2       (*)     MCH 36.8 (*)     MCHC 35.2      RDW 11.9      Platelet Count 244      % Neutrophils 56      % Lymphocytes 29      % Monocytes 7      % Eosinophils 7      % Basophils 1      % Immature Granulocytes 0      NRBCs per 100 WBC 0      Absolute Neutrophils 3.6      Absolute Lymphocytes 1.9      Absolute Monocytes 0.5      Absolute Eosinophils 0.5      Absolute Basophils 0.1      Absolute Immature Granulocytes 0.0      Absolute NRBCs 0.0     LACTIC ACID WHOLE BLOOD - Normal    Lactic Acid 1.0           ED Course      Medications Administered   Medications   piperacillin-tazobactam (ZOSYN) 4.5 g vial to attach to  mL bag (4.5 g Intravenous $New Bag 8/11/24 0349)   ondansetron (ZOFRAN) injection 4 mg (4 mg Intravenous $Given 8/11/24 1548)   HYDROmorphone (DILAUDID) injection 0.2 mg (0.2 mg Intravenous $Given 8/11/24 1543)       Procedures   Procedures   1) Suture removal:  The right shin wound was cleansed with alcohol swab.  20 sutures were carefully removed without incident.    2) Incision and drainage:  The inferior aspect of the right shin wound was anesthetized with 1% lidocaine.  A 1.5 cm incision was made near a necrotic area of skin.  Only scant blood returned without any purulence.    ED Course   ED Course as of 08/11/24 1744   Sun Aug 11, 2024   1451 I obtained history and examined the patient as noted above.           Medical Decision Making / Diagnosis         GISSELLE   Karrie Sheriff is a 74 year old female who presents to the ER for evaluation of right infected leg wound.  Vital signs reassuring.  On exam there is evidence of cellulitic change around a V shaped right shin laceration with some areas of necrotic skin at the apex of the wound.  Sutures were placed nearly 12 days ago and  thus they were removed given concern for superimposed infection.  Labs are reassuring.  I did open the inferior aspect of the wound near the necrotic skin to rule out underlying abscess.  Fortunately, no purulence returned.  Per review of recent wound cultures from outside clinic, patient has both staph and Pseudomonas growing from the wound.  Although patient has been on 3 different antibiotics over the past week, it was not until 2 days ago that patient's antibiotic was switched to Cipro, an appropriate antibiotic to cover Pseudomonas.  Despite taking this medication for 3 doses, she believes she is getting worse.  Ultimately, she was admitted for further IV antibiotics to ensure appropriate response.    Disposition   The patient was admitted to the hospital- DR Lara    Diagnosis     ICD-10-CM    1. Wound infection  T14.8XXA     L08.9            Discharge Medications   New Prescriptions    No medications on file     Scribe Disclosure:  I, DOROTEO GALVEZ, am serving as a scribe at 2:49 PM on 8/11/2024 to document services personally performed by Erick Souza MD based on my observations and the provider's statements to me.      Erick Souza MD  08/11/24 0315

## 2024-08-11 NOTE — ED TRIAGE NOTES
Right shine wound has been infected after sutures placed, since culture had positive pseudomonas, been on 2 different antibiotics but not improving.      Triage Assessment (Adult)       Row Name 08/11/24 7372          Triage Assessment    Airway WDL WDL        Respiratory WDL    Respiratory WDL WDL        Skin Circulation/Temperature WDL    Skin Circulation/Temperature WDL X        Cardiac WDL    Cardiac WDL WDL        Peripheral/Neurovascular WDL    Peripheral Neurovascular WDL WDL        Cognitive/Neuro/Behavioral WDL    Cognitive/Neuro/Behavioral WDL WDL

## 2024-08-11 NOTE — H&P
[facility]    History and Physical - Hospitalist Service       Date of Admission:  8/11/2024    Assessment & Plan      Karrie Sheriff is a 74 year old female admitted on 8/11/2024 with right shin laceration with subsequent wound infection    Right shin laceration, wound infection with cellulitis  *Laceration 7/31 after stumbling and hitting leg on copper pipe, treated at [facility] with tetanus shot, skin repair with 20 sutures.  *8/7 office visit, started on keflex, wound Cx + pseudomonas, +staph aureus. Switched from keflex to bactrim 2 tabs BID on 8/8 and then cipro 750mg BID added 8/10 (received 3 doses prior to admission)  *8/11 sutures removed in ED, no evidence of purulence underlying wound. WBC 6.5. LA 1. Hemodynamically stable.  - observation  - zosyn  - wound RN to eval  - elevate RLE  - continue bacitracin to wound daily for now  - suspect can finish cipro/bactrim on discharge to complete 7 day course   -recommend discharge with prescription for zofran as she has had nausea with antibiotic combo    CKD Stage 2  Baseline Cr 0.9-1. Is 0.97 on admit  - stable    Bipolar disorder  Continue PTA lamotrigine    Hypertension  PTA hydrochlorothiazide, continue for now  - did have mild hyponatremia/hypochloremia on admission, but she denies feeling dehydrated or having reduced intake recently    Chronic constipation  Continue PTA milk of mag at bedtime  - PRN senna-docusate, miralax available    Hypothyroidism- resume levothyroxine on discharge  Hyperlipidemia- resume statin on discharge  GERD- continue PPI  Herpes -continue valtrex  Asthma -resume inhaler on discharge        Diet:  regular  DVT Prophylaxis: Pneumatic Compression Devices  Lechuga Catheter: Not present  Lines: None     Cardiac Monitoring: None  Code Status:  full code    Clinically Significant Risk Factors Present on Admission                                           Disposition Plan     Medically Ready for Discharge:  Anticipated Tomorrow  Pending improvement with IV abx         Tiara Lara DO  Hospitalist Service  [facility]  Securely message with [employer] (more info)  Text page via [facility] Paging/Directory     ______________________________________________________________________    Chief Complaint   Wound infection    History is obtained from the patient, prior record review    History of Present Illness   Karrie Sheriff is a 74 year old female who presents from home with complaint of worsening wound infection. On 7/31 she was gardening when she fell backwards, lacerating her right shin on a copper pipe that was sticking out. She went to [facility] where she had the area cleaned, sutured with 20 sutures and she received a tetanus shot. She had been keeping the area dressed, changing it once daily. On 8/5, the area started to get red and then on 8/6 it started to have drainage. She went to urgent care where they swabbed the pus an the skin, started her on keflex. She went home, it continued to get worse. She was switched from keflex to bactrim. Initial sensitivities came back and ciprofloxacin was added on 8/10 AM due to finding of pseudomonas. She has had nausea with the antibiotics, but denies other symptoms. Reports her oral intake is the same, no issues with diarrhea. No fevers, chills, shortness of breath, chest pain. While in the ED the area was opening by ED physician, all sutures were removed. No purulence was seen.      Past Medical History    Past Medical History:   Diagnosis Date    Bipolar I disorder, most recent episode (or current) unspecified (HCC)     Cataracts, both eyes     Cervicalgia     Difficult intubation     Edema     Genital herpes in women     Malignant hyperthermia     Other nonspecific findings on examination of blood(790.99)     PONV (postoperative nausea and vomiting)     Presbyopia     Pure hypercholesterolemia     Renal disease     RECURRENT UTI    Sciatica      Unspecified asthma(493.90)     Unspecified hypothyroidism     Unspecified hypothyroidism        Past Surgical History   Past Surgical History:   Procedure Laterality Date    ARTHROTOMY SHOULDER, SUBACROMIAL DECOMPRESSION, COMBINED      BLEPHAROPLASTY      COLONOSCOPY      D & C      BURGOS'S NEUROMA[      ORTHOPEDIC SURGERY      OSTEOTOMY FOOT  1/16/2013    Procedure: OSTEOTOMY FOOT;  REVISION RIGHT BUNION REPAIR; REVISION 2ND/3RD METATARSAL WEIL OSTEOTOMIES (C-ARM) (LATEX SENSITIVITY);  Surgeon: Marin Rosa MD;  Location: [facility]    TUBAL LIGATION         Prior to Admission Medications   Prior to Admission Medications   Prescriptions Last Dose Informant Patient Reported? Taking?   Omega-3 Fatty Acids (OMEGA-3 FISH OIL PO) 8/11/2024 at am Self Yes Yes   Sig: Take 1 g by mouth 2 times daily    albuterol (PROAIR HFA, PROVENTIL HFA, VENTOLIN HFA) 108 (90 BASE) MCG/ACT inhaler prn Self Yes No   Sig: Inhale 2 puffs into the lungs every 4 hours as needed   atorvastatin (LIPITOR) 40 MG tablet 8/10/2024 at HS Self Yes Yes   Sig: Take 40 mg by mouth daily   bacitracin 500 UNIT/GM OINT 8/11/2024 Self Yes Yes   Sig: Apply topically daily   calcium-vitamin D (CALTRATE) 600-400 MG-UNIT per tablet 8/11/2024 at am Self Yes Yes   Sig: Take 1 tablet by mouth 2 times daily   cephALEXin (KEFLEX) 500 MG capsule Past Week Self No Yes   Sig: Take 1 capsule (500 mg) by mouth 4 times daily for 10 days   cholecalciferol (VITAMIN D3) 125 mcg (5000 units) capsule 8/11/2024 at am Self Yes Yes   Sig: Take 125 mcg by mouth daily   ciprofloxacin (CIPRO) 250 MG tablet prn Self Yes No   Sig: Take 250 mg by mouth 2 times daily as needed (take for 5 days for bladder infection if needed)   ciprofloxacin (CIPRO) 750 MG tablet 8/11/2024 at am Self No Yes   Sig: Take 1 tablet (750 mg) by mouth 2 times daily for 7 days   estradiol (VAGIFEM) 10 MCG TABS Past Week Self Yes Yes   Sig: Place 10 mcg vaginally twice a week   fluconazole (DIFLUCAN) 150 MG  tablet prn Self Yes No   Sig: Take 150 mg by mouth once as needed (yeast infection)   hydrochlorothiazide (HYDRODIURIL) 50 MG tablet 8/11/2024 at am Self Yes Yes   Sig: Take 50 mg by mouth daily   lamoTRIgine (LAMICTAL) 200 MG tablet 8/11/2024 at am Self Yes Yes   Sig: Take by mouth 2 times daily Take 1/2 tablet (100mg) in the morning and 2 tablets (400mg) in the evening   levothyroxine (SYNTHROID/LEVOTHROID) 100 MCG tablet 8/11/2024 at am Self Yes Yes   Sig: Take 100 mcg by mouth every morning (before breakfast)   magnesium hydroxide (MILK OF MAGNESIA) 400 MG/5ML suspension 8/10/2024 at pm Self Yes Yes   Sig: Take 15 mLs by mouth at bedtime   nitroFURantoin macrocrystal (MACRODANTIN) 100 MG capsule prn Self Yes No   Sig: Take 100 mg by mouth daily as needed (UTI)   omeprazole (PRILOSEC) 20 MG DR capsule 8/11/2024 at am Self Yes Yes   Sig: Take 20 mg by mouth daily   sulfamethoxazole-trimethoprim (BACTRIM DS) 800-160 MG tablet 8/11/2024 at am Self Yes Yes   Sig: Take 2 tablets by mouth 2 times daily X 10 days   valACYclovir (VALTREX) 1000 mg tablet 8/11/2024 at am Self Yes Yes   Sig: Take 1,000 mg by mouth 2 times daily   zolpidem (AMBIEN) 5 MG tablet prn Self Yes No   Sig: Take 2.5-5 mg by mouth nightly as needed for sleep      Facility-Administered Medications: None           Physical Exam   Vital Signs: Temp: 97.7  F (36.5  C) Temp src: Temporal BP: (!) 146/50 Pulse: 85   Resp: 16 SpO2: 99 %      Weight: 148 lbs 0 oz    Constitutional: Awake, alert, cooperative, no apparent distress  Respiratory: Clear to auscultation bilaterally, no crackles or wheezing  Cardiovascular: Regular rate and rhythm, normal S1 and S2, and no murmur noted  GI: Normal bowel sounds, soft, non-distended, non-tender  Skin/Integumen: No cyanosis. R mid shin with Ushaped laceration. Inferior edge has some necrosis, some blood, but no purulence noted. Erythema inferior/medially to laceration with some edema. No tracking erythema up the  leg.  Other:      Medical Decision Making       75 MINUTES SPENT BY ME on the date of service doing chart review, history, exam, documentation & further activities per the note.      Data     I have personally reviewed the following data over the past 24 hrs:    6.5  \   12.4   / 244     133 (L) 96 (L) 11.4 /  107 (H)   4.1 28 0.97 (H) \     Procal: N/A CRP: N/A Lactic Acid: 1.0         Imaging results reviewed over the past 24 hrs:   No results found for this or any previous visit (from the past 24 hour(s)).

## 2024-08-12 VITALS
OXYGEN SATURATION: 99 % | HEIGHT: 65 IN | TEMPERATURE: 98.3 F | SYSTOLIC BLOOD PRESSURE: 105 MMHG | BODY MASS INDEX: 24.66 KG/M2 | RESPIRATION RATE: 18 BRPM | WEIGHT: 148 LBS | HEART RATE: 74 BPM | DIASTOLIC BLOOD PRESSURE: 57 MMHG

## 2024-08-12 LAB
ANION GAP SERPL CALCULATED.3IONS-SCNC: 11 MMOL/L (ref 7–15)
BUN SERPL-MCNC: 9.8 MG/DL (ref 8–23)
CALCIUM SERPL-MCNC: 9.3 MG/DL (ref 8.8–10.4)
CHLORIDE SERPL-SCNC: 101 MMOL/L (ref 98–107)
CREAT SERPL-MCNC: 1.09 MG/DL (ref 0.51–0.95)
EGFRCR SERPLBLD CKD-EPI 2021: 54 ML/MIN/1.73M2
ERYTHROCYTE [DISTWIDTH] IN BLOOD BY AUTOMATED COUNT: 11.9 % (ref 10–15)
GLUCOSE SERPL-MCNC: 99 MG/DL (ref 70–99)
HCO3 SERPL-SCNC: 24 MMOL/L (ref 22–29)
HCT VFR BLD AUTO: 34.5 % (ref 35–47)
HGB BLD-MCNC: 12 G/DL (ref 11.7–15.7)
MAGNESIUM SERPL-MCNC: 2 MG/DL (ref 1.7–2.3)
MCH RBC QN AUTO: 36.1 PG (ref 26.5–33)
MCHC RBC AUTO-ENTMCNC: 34.8 G/DL (ref 31.5–36.5)
MCV RBC AUTO: 104 FL (ref 78–100)
PHOSPHATE SERPL-MCNC: 3 MG/DL (ref 2.5–4.5)
PLATELET # BLD AUTO: 227 10E3/UL (ref 150–450)
POTASSIUM SERPL-SCNC: 4.1 MMOL/L (ref 3.4–5.3)
RBC # BLD AUTO: 3.32 10E6/UL (ref 3.8–5.2)
SODIUM SERPL-SCNC: 136 MMOL/L (ref 135–145)
WBC # BLD AUTO: 6.2 10E3/UL (ref 4–11)

## 2024-08-12 PROCEDURE — 120N000001 HC R&B MED SURG/OB

## 2024-08-12 PROCEDURE — 250N000013 HC RX MED GY IP 250 OP 250 PS 637: Performed by: HOSPITALIST

## 2024-08-12 PROCEDURE — 84100 ASSAY OF PHOSPHORUS: CPT | Performed by: HOSPITALIST

## 2024-08-12 PROCEDURE — 250N000009 HC RX 250: Performed by: HOSPITALIST

## 2024-08-12 PROCEDURE — 85027 COMPLETE CBC AUTOMATED: CPT | Performed by: HOSPITALIST

## 2024-08-12 PROCEDURE — 36415 COLL VENOUS BLD VENIPUNCTURE: CPT | Performed by: HOSPITALIST

## 2024-08-12 PROCEDURE — 96376 TX/PRO/DX INJ SAME DRUG ADON: CPT

## 2024-08-12 PROCEDURE — 99239 HOSP IP/OBS DSCHRG MGMT >30: CPT

## 2024-08-12 PROCEDURE — 83735 ASSAY OF MAGNESIUM: CPT | Performed by: HOSPITALIST

## 2024-08-12 PROCEDURE — G0463 HOSPITAL OUTPT CLINIC VISIT: HCPCS

## 2024-08-12 PROCEDURE — 99222 1ST HOSP IP/OBS MODERATE 55: CPT | Mod: FS | Performed by: SURGERY

## 2024-08-12 PROCEDURE — 80048 BASIC METABOLIC PNL TOTAL CA: CPT | Performed by: HOSPITALIST

## 2024-08-12 PROCEDURE — 99207 PR APP CREDIT; MD BILLING SHARED VISIT: CPT | Performed by: PHYSICIAN ASSISTANT

## 2024-08-12 PROCEDURE — G0378 HOSPITAL OBSERVATION PER HR: HCPCS

## 2024-08-12 PROCEDURE — 250N000011 HC RX IP 250 OP 636: Performed by: HOSPITALIST

## 2024-08-12 RX ORDER — ONDANSETRON 4 MG/1
4 TABLET, ORALLY DISINTEGRATING ORAL EVERY 6 HOURS PRN
Qty: 20 TABLET | Refills: 1 | Status: SHIPPED | OUTPATIENT
Start: 2024-08-12

## 2024-08-12 RX ADMIN — PIPERACILLIN AND TAZOBACTAM 3.38 G: 3; .375 INJECTION, POWDER, FOR SOLUTION INTRAVENOUS at 10:56

## 2024-08-12 RX ADMIN — PANTOPRAZOLE SODIUM 40 MG: 40 TABLET, DELAYED RELEASE ORAL at 08:01

## 2024-08-12 RX ADMIN — LAMOTRIGINE 100 MG: 100 TABLET ORAL at 08:01

## 2024-08-12 RX ADMIN — PIPERACILLIN AND TAZOBACTAM 3.38 G: 3; .375 INJECTION, POWDER, FOR SOLUTION INTRAVENOUS at 16:47

## 2024-08-12 RX ADMIN — PIPERACILLIN AND TAZOBACTAM 3.38 G: 3; .375 INJECTION, POWDER, FOR SOLUTION INTRAVENOUS at 05:58

## 2024-08-12 RX ADMIN — BACITRACIN: 500 OINTMENT TOPICAL at 08:01

## 2024-08-12 RX ADMIN — HYDROCHLOROTHIAZIDE 50 MG: 25 TABLET ORAL at 08:01

## 2024-08-12 RX ADMIN — VALACYCLOVIR HYDROCHLORIDE 1000 MG: 1 TABLET, FILM COATED ORAL at 08:01

## 2024-08-12 ASSESSMENT — ACTIVITIES OF DAILY LIVING (ADL)
ADLS_ACUITY_SCORE: 31

## 2024-08-12 NOTE — DISCHARGE SUMMARY
[facility]  Hospitalist Discharge Summary      Date of Admission:  8/11/2024  Date of Discharge:  8/12/2024  6:02 PM  Discharging Provider: Danelle Golden PA-C  Discharge Service: Hospitalist Service    Discharge Diagnoses   Right shin laceration and wound infection with cellulitis  CKD Stage 2  Bipolar disorder  Hypertension  Chronic constipation  Hypothyroidism  Hyperlipidemia  GERD  Herpes  Asthma     Follow-ups Needed After Discharge   Follow up with primary care provider within 7 days for hospital follow-up. The following labs/tests are recommended: CBC and BMP. Per discussion, finish remaining course of antibiotics (Ciprofloxacin and Bactrim) upon discharge.     Discharge Disposition   Discharged to home  Condition at discharge: Stable    Hospital Course   Karrie Sheriff is a 74 year old female admitted on 8/11/2024 with right shin laceration with subsequent wound infection     Right shin laceration, wound infection with cellulitis  *Laceration 7/31 after stumbling and hitting leg on copper pipe, treated at [facility] with tetanus shot, skin repair with 20 sutures.  *8/7 office visit, started on keflex, wound Cx + pseudomonas, +staph aureus. Switched from keflex to bactrim 2 tabs BID on 8/8 and then cipro 750mg BID added 8/10 (received 3 doses prior to admission).  *8/11 sutures removed in ED, no evidence of purulence underlying wound. WBC 6.5. LA 1. Hemodynamically stable. Started on IV zosyn. Received x4 doses. Wound care per WOC RN. Surrounding erythema improving. General surgery consulted and no surgical debridement or intervention at this time. Patient to complete cipro/bactrim upon discharge to complete 7 day course. Follow-up with PCP within one week for hospital follow-up, per above.     CKD Stage 2  *Baseline Cr 0.9-1. Is 0.97 on admit. Stable.      Bipolar disorder  *Continue PTA lamotrigine upon discharge.      Hypertension  *Continue PTA hydrochlorothiazide upon  discharge.      Chronic constipation  *Continue PTA milk of mag at bedtime upon discharge.      Hypothyroidism-  Continue PTA levothyroxine upon discharge.  Hyperlipidemia- Continue PTA statin upon discharge.  GERD- Continue PTA PPI upon discharge.   Herpes- Continue PTA valtrex upon discharge.   Asthma- Continue PTA inhaler upon discharge.      Consultations This Hospital Stay   WOUND OSTOMY CONTINENCE NURSE  IP CONSULT  SURGERY GENERAL IP CONSULT    Code Status   Prior    Time Spent on this Encounter   I, Danelle Golden PA-C, personally saw the patient today and spent greater than 30 minutes discharging this patient. Patient discussed with attending physician Dr. Paulino.        Danelle Golden PA-C  [facility]TENDED RECOV[facility] AND [facility]ORT STAY  [address] [address]  Phone: [phone]  ______________________________________________________________________    Physical Exam   Vital Signs: Temp: 98.3  F (36.8  C) Temp src: Oral BP: 105/57 Pulse: 74   Resp: 18 SpO2: 99 % O2 Device: None (Room air)    Weight: 148 lbs 0 oz  GENERAL:  Pleasant, cooperative, alert. Sitting in chair comfortably upon examination.   HEENT: Normocephalic, atraumatic.    PULMONOLOGY: No increased work of breathing.   CARDIAC: Regular rate and rhythm.   MUSCULOSKELETAL:  Moving x 4 spontaneously. Right lower extremity with V-shaped tear with central necrosis and surrounding blanchable erythema.   NEURO: Alert and oriented x3.     Primary Care Physician   Physician No Ref-Primary    Discharge Orders      Activity    Your activity upon discharge: activity as tolerated     Wound care and dressings    Instructions to care for your wound at home: as directed (see below).    Right lower leg wound: Daily (unless any new orders from Surgery)  1. Cleanse with Vashe-moist gauze.  After cleansing away any old gel/drainage, apply Vashe-moist gauze to wound bed and let soak for at least 2  minutes.   2. On even days, apply Iodosorb gel to wound (or to the dressing), at least nickel-thick.  On odd days, apply Triad paste to wound (or to the dressing), at least nickel thick.   3. Cover with oil emulsion layer (adaptic), then 4x4 gauze pads.   4. Secure with roll gauze.   5. Elevate leg when possible.     Follow-up and recommended labs and tests     Follow up with primary care provider within 7 days for hospital follow-up. The following labs/tests are recommended: CBC and BMP. Finish your remaining course of antibiotics (Ciprofloxacin and Bactrim) upon discharge.     Reason for your hospital stay    You were hospitalized for further evaluation of your worsening right lower leg cellulitis. You were started on IV antibiotics (Zosyn). Per discussion, finish your remaining course of prior to arrival antibiotics (Ciprofloxacin and Bactrim) upon discharge.     Diet    Follow this diet upon discharge: Orders Placed This Encounter      Regular Diet Adult       Significant Results and Procedures   Most Recent 3 CBC's:  Recent Labs   Lab Test 08/12/24  0736 08/11/24  1543   WBC 6.2 6.5   HGB 12.0 12.4   * 105*    244     Most Recent 3 Hemoglobins:  Recent Labs   Lab Test 08/12/24  0736 08/11/24  1543   HGB 12.0 12.4   ,   Results for orders placed or performed during the hospital encounter of 03/17/14   MR Brain w/o & w Contrast    Narrative    MR BRAIN WITHOUT AND WITH CONTRAST 3/17/2014 1:39 PM    HISTORY: Acute confusion.    TECHNIQUE: Routine pulse sequences without and with contrast. 15 mL of  Magnevist given.    FINDINGS: Diffusion-weighted images are normal. The brain parenchyma,  brainstem, ventricular system, subarachnoid spaces, and vascular  structures are normal in appearance. Specifically, there is no  evidence for any acute infarction, acute hemorrhage, or any  intracranial mass lesions. Postcontrast images do not show any  abnormal enhancement.      Impression    IMPRESSION: Negative  brain and brainstem MRI examination without and  with intravenous contrast.    DIGNA WASHINGTON MD   MRA Carotid & MR Angiogram    Narrative    MRA of the head without contrast  MRA of the neck without and with contrast    History:  TIA Workup.    Comparison:  MRI of the brain dated 3/17/2014    Technique:   MRA: 3D time-of-flight MR angiography of the major arteries at the  base of the brain was performed without contrast.  2D time-of-flight  MRA without contrast with superior and inferior saturation bands and  3D T1-weighted postgadolinium MRA of the neck/cervical vessels were  also obtained.    Contrast: 5.0 ml of gadavist    Findings:  MRA of the major arteries at the base of the brain  demonstrates no evidence of stenosis or aneurysm. The anterior  communicating artery appears patent. Regarding posterior communicating  arteries, both are patent.    MRA of the neck demonstrates patent bilateral internal and external  carotid arteries. There is no evidence of plaque formation in the  bilateral carotid bulbi or internal carotid artery origins. There is  no evidence of stenosis at the origins and course of the bilateral  vertebral arteries. The basilar artery appears patent.      Impression    Impression:  Normal MRA of the neck and the base of the brain.    I have personally reviewed the examination and initial interpretation  and I agree with the findings.    JOVANA DUNN MD   MRA Head w/o Contrast Angiogram    Narrative    MRA of the head without contrast  MRA of the neck without and with contrast    History:  TIA Workup.    Comparison:  MRI of the brain dated 3/17/2014    Technique:   MRA: 3D time-of-flight MR angiography of the major arteries at the  base of the brain was performed without contrast.  2D time-of-flight  MRA without contrast with superior and inferior saturation bands and  3D T1-weighted postgadolinium MRA of the neck/cervical vessels were  also obtained.    Contrast: 5.0 ml of  gadavist    Findings:  MRA of the major arteries at the base of the brain  demonstrates no evidence of stenosis or aneurysm. The anterior  communicating artery appears patent. Regarding posterior communicating  arteries, both are patent.    MRA of the neck demonstrates patent bilateral internal and external  carotid arteries. There is no evidence of plaque formation in the  bilateral carotid bulbi or internal carotid artery origins. There is  no evidence of stenosis at the origins and course of the bilateral  vertebral arteries. The basilar artery appears patent.      Impression    Impression:  Normal MRA of the neck and the base of the brain.    I have personally reviewed the examination and initial interpretation  and I agree with the findings.    JOVANA DUNN MD   Echo complete bubble     Value    XCELERA         Interpretation Summary  No cardiac source for embolus identified.  Global and regional left ventricular function is normal with an EF of 55-60%.   The atrial septum is intact as assessed by color Doppler and agitated   dextrose bubble study .  PatientHeight: 65 in  PatientWeight: 141 lbs  SystolicPressure: 119 mmHg  DiastolicPressure: 69 mmHg  BSA 1.7 m^2        Procedure  Bubble Echocardiogram with two-dimensional, color and spectral Doppler   performed.     Left Ventricle  Global and regional left ventricular function is normal with an EF of 55-60%.     Right Ventricle  The right ventricle is normal size.  Global right ventricular function is normal.     Atria  Both atria appear normal.  The atrial septum is intact as assessed by color Doppler and agitated   dextrose bubble study .     Mitral Valve  The mitral valve is normal.     Aortic Valve  Aortic valve is normal in structure and function.     Tricuspid Valve  The tricuspid valve is normal.  Trace tricuspid insufficiency is present.  The TR peak pressure gradient is 11 mmHg .     Pulmonic Valve  The pulmonic valve is normal.     Vessels  The  inferior vena cava  is normal.     Pericardium  No pericardial effusion is present.     MMode 2D Measurements & Calculations  IVSd: 0.69 cm  LVIDd: 4.3 cm  LVIDs: 2.5 cm  LVPWd: 0.79 cm  FS: 41 %  LV mass(C)d: 96 grams  Ao root diam: 2.5 cm  LA dimension: 3.9 cm  LA/Ao: 1.6   LVOT diam: 2.1 cm  Doppler Measurements & Calculations  MV E point: 81 cm/sec  MV A point: 53 cm/sec  MV E/A: 1.5   MV dec time: 0.17 sec  TR Max karen: 164 cm/sec  TR Max P mmHg     Interpreting Physician:  ALAN Mora MD electronically signed on 2014   12:28:51       Discharge Medications   Discharge Medication List as of 2024  5:55 PM        START taking these medications    Details   ondansetron (ZOFRAN ODT) 4 MG ODT tab Take 1 tablet (4 mg) by mouth every 6 hours as needed for nausea or vomiting, Disp-20 tablet, R-1, E-Prescribe           CONTINUE these medications which have NOT CHANGED    Details   atorvastatin (LIPITOR) 40 MG tablet Take 40 mg by mouth daily, Historical      bacitracin 500 UNIT/GM OINT Apply topically dailyHistorical      calcium-vitamin D (CALTRATE) 600-400 MG-UNIT per tablet Take 1 tablet by mouth 2 times daily, Historical      cholecalciferol (VITAMIN D3) 125 mcg (5000 units) capsule Take 125 mcg by mouth daily, Historical      !! ciprofloxacin (CIPRO) 750 MG tablet Take 1 tablet (750 mg) by mouth 2 times daily for 7 days, Disp-14 tablet, R-0, E-Prescribe      estradiol (VAGIFEM) 10 MCG TABS Place 10 mcg vaginally twice a week, Historical      hydrochlorothiazide (HYDRODIURIL) 50 MG tablet Take 50 mg by mouth daily, Historical      lamoTRIgine (LAMICTAL) 200 MG tablet Take by mouth 2 times daily Take 1/2 tablet (100mg) in the morning and 2 tablets (400mg) in the evening, Historical      levothyroxine (SYNTHROID/LEVOTHROID) 100 MCG tablet Take 100 mcg by mouth every morning (before breakfast), Historical      magnesium hydroxide (MILK OF MAGNESIA) 400 MG/5ML suspension Take 15 mLs by mouth at bedtime,  Historical      Omega-3 Fatty Acids (OMEGA-3 FISH OIL PO) Take 1 g by mouth 2 times daily , Historical      omeprazole (PRILOSEC) 20 MG DR capsule Take 20 mg by mouth daily, Historical      sulfamethoxazole-trimethoprim (BACTRIM DS) 800-160 MG tablet Take 2 tablets by mouth 2 times daily X 10 days, Historical      valACYclovir (VALTREX) 1000 mg tablet Take 1,000 mg by mouth 2 times daily, Historical      albuterol (PROAIR HFA, PROVENTIL HFA, VENTOLIN HFA) 108 (90 BASE) MCG/ACT inhaler Inhale 2 puffs into the lungs every 4 hours as needed, Historical      !! ciprofloxacin (CIPRO) 250 MG tablet Take 250 mg by mouth 2 times daily as needed (take for 5 days for bladder infection if needed), Historical      fluconazole (DIFLUCAN) 150 MG tablet Take 150 mg by mouth once as needed (yeast infection), Historical      nitroFURantoin macrocrystal (MACRODANTIN) 100 MG capsule Take 100 mg by mouth daily as needed (UTI), Historical      zolpidem (AMBIEN) 5 MG tablet Take 2.5-5 mg by mouth nightly as needed for sleep, Historical       !! - Potential duplicate medications found. Please discuss with provider.        STOP taking these medications       cephALEXin (KEFLEX) 500 MG capsule Comments:   Reason for Stopping:             Allergies   Allergies   Allergen Reactions    Zomepirac      ANGIOEDEMA      Latex Rash     rash

## 2024-08-12 NOTE — PROGRESS NOTES
A/Ox4, VSS RA. Pt able to ambulate ind in room. Denies pain and nausea at this time. Received dil and zofran prior to transfer. Wound to RLE bandaged with gauze and tape, CDI. Pt reports drainage. Wound bed dry scab with erythema in edward wound. Resting comfortably.

## 2024-08-12 NOTE — PROGRESS NOTES
Observation goals  PRIOR TO DISCHARGE       Comments:   -diagnostic tests and consults completed and resulted : Not met, awaiting WOC to assess wound.   -vital signs normal or at patient baseline -Met  -tolerating oral antibiotics or has plans for home infusion setup -Met  -infection is improving- Met, improving per patient.   Nurse to notify provider when observation goals have been met and patient is ready for discharge.

## 2024-08-12 NOTE — PROGRESS NOTES
Pt is discharging home at this time w/ all pt's belongings. AVS and education given. Questions answered. Pt's Spouse will transport.    Xelrandyz Pregnancy And Lactation Text: This medication is Pregnancy Category D and is not considered safe during pregnancy.  The risk during breast feeding is also uncertain.

## 2024-08-12 NOTE — PLAN OF CARE
PRIMARY Concern: Wound Check   SAFETY RISK Concerns (fall risk, behaviors, etc.): Fall       Isolation/Type: None  Tests/Procedures for NEXT shift: None  Consults? (Pending/following, signed-off?) Wound Care   Where is patient from? (Home, TCU, etc.): Home  Other Important info for NEXT shift:   Anticipated DC date & active delays: TBD  _____________________________________________________________________________  SUMMARY NOTE:        Orientation/Cognitive: A&OX4  Observation Goals (Met/ Not Met): Not Met  Mobility Level/Assist Equipment: IND  Antibiotics & Plan (IV/po, length of tx left): Zosyn Q6H  Pain Management: Denies   Tele/VS/O2: VSS/RA  ABNL Lab/BG: See Chart  Diet: Regular Diet  Bowel/Bladder: Continent  Drains/Devices: PIV/SL  Patient Stated Goal for Today: Sleep       Observation goals  PRIOR TO DISCHARGE       Comments: -diagnostic tests and consults completed and resulted -Met  -vital signs normal or at patient baseline -Not Met  -tolerating oral antibiotics or has plans for home infusion setup -Not Met  -infection is improving- Not Met  Nurse to notify provider when observation goals have been met and patient is ready for discharge.

## 2024-08-12 NOTE — PROGRESS NOTES
Observation goals  PRIOR TO DISCHARGE       Comments: -diagnostic tests and consults completed and resulted -Met  -vital signs normal or at patient baseline -Not Met  -tolerating oral antibiotics or has plans for home infusion setup -Not Met  -infection is improving- Not Met  Nurse to notify provider when observation goals have been met and patient is ready for discharge.

## 2024-08-12 NOTE — DISCHARGE INSTRUCTIONS
Right lower leg wound: Daily  Cleanse with Vashe-moist gauze.  After cleansing away any old gel/drainage, apply Vashe-moist gauze to wound bed and let soak for at least 2 minutes.   On even days, apply Iodosorb gel to wound (or to the dressing), at least nickel-thick.  On odd days, apply Triad paste to wound (or to the dressing), at least nickel thick.   Cover with non-adherent dressing.   Elevate leg when possible.     Follow-up in a wound clinic, [facility] [place] [phone].

## 2024-08-12 NOTE — CONSULTS
[facility] General Surgery Consultation    Karrie Sheriff MRN# [id number]   YOB: 1950 Age: 74 year old      Date of Admission:  8/11/2024  Date of Consult: 8/12/2024         Assessment and Plan:   Patient is a 74 year old female with right shin laceration previously closed with sutures at outside facility on 7/31, now with cellulitis.     PLAN:  - Antibiotics per primary team vs ID consult if needed.  - [facility] nurse local wound cares. Appreciate assistance.  - No surgical debridement or intervention needed. We will sign off. Please call if questions or concerns.         Requesting Physician:      Danelle Golden PA-C        Chief Complaint:     Chief Complaint   Patient presents with    Wound Check          History of Present Illness:   Karrie Sheriff is a 74 year old female who was admitted yesterday with right shin laceration and surrounding cellulitis. She has history of right shin laceration 7/31 after stumbling and hitting leg on copper pipe. She was treated at [facility] with tetanus shot and skin repair with 20 sutures. She was seen at office visit on 8/7 and found to have wound infection and culture obtained with + pseudomonas and +staph aureus. She was switched from keflex to bactrim 2 tabs BID on 8/8 and then cipro 750mg BID added 8/10. She had taken 3 doses prior to ED presentation yesterday. She went to the ED for wound check due to concern of worsening redness and swelling around wound. She has not had purulent drainage, fever, or chills. In the ED there was no evidence of purulence underlying wound but noted cellulitis. WBC 6.5. LA 1. She is hemodynamically stable and was admitted for IV antibiotics. Patient is not on any immunosuppression or blood thinning medications. She feels well today and hoping to eat. No concerns or complaints today.          Physical Exam:   Blood pressure 105/50, pulse 75, temperature 97.6  F (36.4  C), temperature source Oral, resp. rate 17,  "height 1.651 m (5' 5\"), weight 67.1 kg (148 lb), SpO2 96%.  148 lbs 0 oz  General: Vital signs reviewed, in no apparent distress  Eyes: Anicteric  HENT: Normocephalic, atraumatic, trachea midline   Respiratory: Breathing nonlabored  Cardiovascular: Regular rate  GI: Abdomen soft, nondistended  Musculoskeletal: right lower leg- picture per WOC note. Superficial eschar, no deep connection or purulent drainage    Neurologic: Grossly nonfocal exam  Psychiatric: Normal mood, affect and insight  Integumentary: Warm and dry         Past Medical History:     Past Medical History:   Diagnosis Date    Bipolar I disorder, most recent episode (or current) unspecified (HCC)     Cataracts, both eyes     Cervicalgia     Difficult intubation     Edema     Genital herpes in women     Malignant hyperthermia     Other nonspecific findings on examination of blood(790.99)     PONV (postoperative nausea and vomiting)     Presbyopia     Pure hypercholesterolemia     Renal disease     RECURRENT UTI    Sciatica     Unspecified asthma(493.90)     Unspecified hypothyroidism     Unspecified hypothyroidism             Past Surgical History:     Past Surgical History:   Procedure Laterality Date    ARTHROTOMY SHOULDER, SUBACROMIAL DECOMPRESSION, COMBINED      BLEPHAROPLASTY      COLONOSCOPY      D & C      BURGOS'S NEUROMA[      ORTHOPEDIC SURGERY      OSTEOTOMY FOOT  1/16/2013    Procedure: OSTEOTOMY FOOT;  REVISION RIGHT BUNION REPAIR; REVISION 2ND/3RD METATARSAL WEIL OSTEOTOMIES (C-ARM) (LATEX SENSITIVITY);  Surgeon: Marin Rosa MD;  Location: [facility]    TUBAL LIGATION              Current Medications:         Current Facility-Administered Medications   Medication Dose Route Frequency Provider Last Rate Last Admin    bacitracin ointment   Topical Daily Tiaar Lara DO   Given at 08/12/24 0801    hydrochlorothiazide (HYDRODIURIL) tablet 50 mg  50 mg Oral Daily Tiara Lara DO   50 mg at 08/12/24 0801    " lamoTRIgine (LaMICtal) tablet 100 mg  100 mg Oral Daily Taira Lara DO   100 mg at 08/12/24 0801    lamoTRIgine (LaMICtal) tablet 400 mg  400 mg Oral QPM Tiara Lara DO   400 mg at 08/11/24 1947    magnesium hydroxide (MILK OF MAGNESIA) suspension 15 mL  15 mL Oral QPM Tiara Lara DO   15 mL at 08/11/24 1947    pantoprazole (PROTONIX) EC tablet 40 mg  40 mg Oral Daily Tiara Lara DO   40 mg at 08/12/24 0801    piperacillin-tazobactam (ZOSYN) 3.375 g vial to attach to  mL bag  3.375 g Intravenous Q6H Tiara Lara DO   3.375 g at 08/12/24 1056    valACYclovir (VALTREX) tablet 1,000 mg  1,000 mg Oral BID Tiara Lara DO   1,000 mg at 08/12/24 0801       Current Facility-Administered Medications   Medication Dose Route Frequency Provider Last Rate Last Admin    acetaminophen (TYLENOL) tablet 650 mg  650 mg Oral Q4H PRN Tiara Lara DO        Or    acetaminophen (TYLENOL) Suppository 650 mg  650 mg Rectal Q4H PRN Tiara Lara DO        melatonin tablet 1 mg  1 mg Oral At Bedtime PRN Tiara Lara DO        naloxone (NARCAN) injection 0.2 mg  0.2 mg Intravenous Q2 Min PRN Tiara Lara DO        Or    naloxone (NARCAN) injection 0.4 mg  0.4 mg Intravenous Q2 Min PRN Tiara Lara DO        Or    naloxone (NARCAN) injection 0.2 mg  0.2 mg Intramuscular Q2 Min PRN Tiara Lara DO        Or    naloxone (NARCAN) injection 0.4 mg  0.4 mg Intramuscular Q2 Min PRN Tiara Lara DO        ondansetron (ZOFRAN ODT) ODT tab 4 mg  4 mg Oral Q6H PRN Tiara Lara DO        Or    ondansetron (ZOFRAN) injection 4 mg  4 mg Intravenous Q6H PRN Tiara Larath,         oxyCODONE (ROXICODONE) tablet 5 mg  5 mg Oral Q4H PRN Marco, Tiara Murrell DO        oxyCODONE IR (ROXICODONE) half-tab 2.5 mg  2.5 mg Oral Q4H PRN Marco,  Tiara Murrell DO        polyethylene glycol (MIRALAX) Packet 17 g  17 g Oral BID Tiara Saravia DO        prochlorperazine (COMPAZINE) injection 5 mg  5 mg Intravenous Q6H Tiara Saravia DO        Or    prochlorperazine (COMPAZINE) tablet 5 mg  5 mg Oral Q6H PRTiara Rowley DO        Or    prochlorperazine (COMPAZINE) suppository 12.5 mg  12.5 mg Rectal Q12H Tiara Saravia DO        senna-docusate (SENOKOT-S/PERICOLACE) 8.6-50 MG per tablet 1 tablet  1 tablet Oral BID Tiara Saravia DO        Or    senna-docusate (SENOKOT-S/PERICOLACE) 8.6-50 MG per tablet 2 tablet  2 tablet Oral BID Tiara Saravia DO        zolpidem (AMBIEN) half-tab 2.5 mg  2.5 mg Oral At Bedtime Tiara Saravia DO        Or    zolpidem (AMBIEN) tablet 5 mg  5 mg Oral At Bedtime Tiara Saravia DO   5 mg at 08/11/24 2342            Home Medications:     Prior to Admission medications    Medication Sig Last Dose Taking? Auth Provider Long Term End Date   atorvastatin (LIPITOR) 40 MG tablet Take 40 mg by mouth daily 8/10/2024 at HS Yes Reported, Patient Yes    bacitracin 500 UNIT/GM OINT Apply topically daily 8/11/2024 Yes Unknown, Entered By History     calcium-vitamin D (CALTRATE) 600-400 MG-UNIT per tablet Take 1 tablet by mouth 2 times daily 8/11/2024 at am Yes Unknown, Entered By History     cephALEXin (KEFLEX) 500 MG capsule Take 1 capsule (500 mg) by mouth 4 times daily for 10 days Past Week Yes Mitali Mcnally NP  8/17/24   cholecalciferol (VITAMIN D3) 125 mcg (5000 units) capsule Take 125 mcg by mouth daily 8/11/2024 at am Yes Unknown, Entered By History     ciprofloxacin (CIPRO) 750 MG tablet Take 1 tablet (750 mg) by mouth 2 times daily for 7 days 8/11/2024 at am Yes Kirk Damon PA-C  8/17/24   estradiol (VAGIFEM) 10 MCG TABS Place 10 mcg vaginally twice a week Past Week Yes Unknown, Entered By  History     hydrochlorothiazide (HYDRODIURIL) 50 MG tablet Take 50 mg by mouth daily 8/11/2024 at am Yes Unknown, Entered By History Yes    lamoTRIgine (LAMICTAL) 200 MG tablet Take by mouth 2 times daily Take 1/2 tablet (100mg) in the morning and 2 tablets (400mg) in the evening 8/11/2024 at am Yes Unknown, Entered By History     levothyroxine (SYNTHROID/LEVOTHROID) 100 MCG tablet Take 100 mcg by mouth every morning (before breakfast) 8/11/2024 at am Yes Unknown, Entered By History     magnesium hydroxide (MILK OF MAGNESIA) 400 MG/5ML suspension Take 15 mLs by mouth at bedtime 8/10/2024 at pm Yes Unknown, Entered By History     Omega-3 Fatty Acids (OMEGA-3 FISH OIL PO) Take 1 g by mouth 2 times daily  8/11/2024 at am Yes Reported, Patient     omeprazole (PRILOSEC) 20 MG DR capsule Take 20 mg by mouth daily 8/11/2024 at am Yes Unknown, Entered By History     sulfamethoxazole-trimethoprim (BACTRIM DS) 800-160 MG tablet Take 2 tablets by mouth 2 times daily X 10 days 8/11/2024 at am Yes Unknown, Entered By History     valACYclovir (VALTREX) 1000 mg tablet Take 1,000 mg by mouth 2 times daily 8/11/2024 at am Yes Unknown, Entered By History     albuterol (PROAIR HFA, PROVENTIL HFA, VENTOLIN HFA) 108 (90 BASE) MCG/ACT inhaler Inhale 2 puffs into the lungs every 4 hours as needed prn  Unknown, Entered By History Yes    ciprofloxacin (CIPRO) 250 MG tablet Take 250 mg by mouth 2 times daily as needed (take for 5 days for bladder infection if needed) prn  Unknown, Entered By History     fluconazole (DIFLUCAN) 150 MG tablet Take 150 mg by mouth once as needed (yeast infection) prn  Unknown, Entered By History     nitroFURantoin macrocrystal (MACRODANTIN) 100 MG capsule Take 100 mg by mouth daily as needed (UTI) prn  Unknown, Entered By History     zolpidem (AMBIEN) 5 MG tablet Take 2.5-5 mg by mouth nightly as needed for sleep prn  Unknown, Entered By History              Allergies:     Allergies   Allergen Reactions     Zomepirac      ANGIOEDEMA      Latex Rash     rash            Family History:   No family history on file.        Social History:   Karrie Sheriff  reports that she has never smoked. She does not have any smokeless tobacco history on file. She reports current alcohol use. She reports that she does not use drugs.          Review of Systems:   The 12 point Review of Systems is negative other than noted in the HPI.         Labs/Imaging   All new lab and imaging data was reviewed.   Recent Labs   Lab 08/12/24  0736 08/11/24  1543   WBC 6.2 6.5   HGB 12.0 12.4   HCT 34.5* 35.2   * 105*    244     Recent Labs   Lab 08/12/24  0736 08/11/24  1543    133*   POTASSIUM 4.1 4.1   CHLORIDE 101 96*   CO2 24 28   ANIONGAP 11 9   GLC 99 107*   BUN 9.8 11.4   CR 1.09* 0.97*   GFRESTIMATED 54* 62   JOELLE 9.3 9.3   MAG 2.0  --    PHOS 3.0  --        I have personally reviewed the imaging studies-     XR TIBIA AND FIBULA  INDICATION: R shin lac with exposed tendon, concern for any potential retained foreign bodies., PAIN   COMPARISON: None.  IMPRESSION: Laceration of the mid anterolateral tibial soft tissues with expected subcutaneous air. No fracture or radiopaque foreign body.       Diya Bruno PA-C    50 minutes spent on date of the encounter doing patient visit, chart review, and documentation.

## 2024-08-12 NOTE — CONSULTS
[facility]  WOC Nurse Inpatient Assessment     Consulted for:  Right shin    Summary:  Right anterior lower leg: trauma wound from fall onto copper pipe at home on 7/31/24.  Wound was initially sutured closed but has since deteriorated with increasing necrosis of skin flap and wound edges.  Wound is boggy but not acutely fluctuant.  May benefit from surgical debridement; PA notified and will consult Surgery.  Otherwise [facility] will provide wound care recs to help with autolytic debridement.  Pt should follow-up in wound clinic.     Patient History (according to provider note(s):      Karrie Sheriff is a 74 year old female admitted on 8/11/2024 with right shin laceration with subsequent wound infection     Areas Assessed:      Areas visualized during today's visit:  right leg    Wound location: Right anterior lower leg    Last photo: 8-12-24 8-12-24      Wound due to: Trauma  Wound history/plan of care: pt states she tripped on a hose and fell backward landing on a protruding copper pipe on 7/31.  She was seen at [facility] that day and wound was sutured.  She developed s/s infection and was seen in urgent care 8/7 and given abx and hibiclens and bacitracin.  Continued to worsen and admitted to [facility] 8/11, all sutures removed in ED.    Wound base: Brown soft moist adherent eschar with yellow slough at edges     Palpation of the wound bed: boggy      Drainage: small     Description of drainage: serosanguinous and tan     Measurements (length x width x depth, in cm): 8 x 7 V-shape tear with central necrosis area approx 2.5 x 5 x 0.3+cm      Tunneling: N/A     Undermining: N/A  Periwound skin: Erythema- blanchable      Color: pink      Temperature: normal   Odor: none  Pain: moderate, tender  Pain interventions prior to dressing change: slow and gentle cares   Treatment goal: Heal , Drainage control, Infection control/prevention, Remove necrotic tissue, and Soften necrotic tissue  STATUS: initial  assessment and evolving  Supplies ordered: gathered and at bedside       Treatment Plan:     Right lower leg wound: Daily (unless any new orders from Surgery)  Cleanse with Vashe-moist gauze.  After cleansing away any old gel/drainage, apply Vashe-moist gauze to wound bed and let soak for at least 2 minutes.   On even days, apply Iodosorb gel to wound (or to the dressing), at least nickel-thick.  On odd days, apply Triad paste to wound (or to the dressing), at least nickel thick.   Cover with oil emulsion layer (adaptic), then 4x4 gauze pads.   Secure with roll gauze.   Elevate leg when possible.       Orders: Written    RECOMMEND PRIMARY TEAM ORDER: Surgical consult  Education provided: plan of care  Discussed plan of care with: Patient, Nurse, and Physicians Assistant [place]  [facility] nurse follow-up plan: weekly and prn  Notify [facility] if wound(s) deteriorate.  Nursing to notify the Provider(s) and re-consult the [facility] Nurse if new skin concern.    DATA:     Current support surface: Standard  Standard Isoflex gel  Containment of urine/stool: Continent of bladder and Continent of bowel  BMI: Body mass index is 24.63 kg/m .   Active diet order: Orders Placed This Encounter      NPO per Anesthesia Guidelines for Procedure/Surgery Except for: Meds     Output: I/O last 3 completed shifts:  In: 100 [IV Piggyback:100]  Out: -      Labs:   Recent Labs   Lab 08/12/24  0736   HGB 12.0   WBC 6.2     Pressure injury risk assessment:   Sensory Perception: 3-->slightly limited  Moisture: 3-->occasionally moist  Activity: 3-->walks occasionally  Mobility: 3-->slightly limited  Nutrition: 3-->adequate  Friction and Shear: 3-->no apparent problem  Rikki Score: 18    Roxana Bales RN CWOCN  -Securely message with [employer] ([facility] [email or web address])   -[facility] Office Phone: [phone] (messages checked periodically Mon-Fri 8a-4p)

## 2024-08-13 ENCOUNTER — PATIENT OUTREACH (OUTPATIENT)
Dept: CARE COORDINATION | Facility: CLINIC | Age: 74
End: 2024-08-13
Payer: MEDICARE

## 2024-08-13 NOTE — PROGRESS NOTES
Clinic Care Coordination Contact  Transitions of Care Outreach  Chief Complaint   Patient presents with    Clinic Care Coordination - Post Hospital       Most Recent Admission Date: 8/11/2024   Most Recent Admission Diagnosis: Wound infection - T14.8XXA, L08.9     Most Recent Discharge Date: 8/12/2024   Most Recent Discharge Diagnosis: Wound infection - T14.8XXA, L08.9  Nausea - R11.0     Transitions of Care Assessment    Discharge Assessment  How are you doing now that you are home?: Patient shares that she is doing well. No questions/concerns or needs at this time. Plans to schedule follow up appt on her own.  How are your symptoms? (Red Flag symptoms escalate to triage hotline per guidelines): Improved  Do you know how to contact your clinic care team if you have future questions or changes to your health status? : Yes  Does the patient have their discharge instructions? : Yes  Does the patient have questions regarding their discharge instructions? : No  Were you started on any new medications or were there changes to any of your previous medications? : Yes  Does the patient have all of their medications?: Yes  Do you have questions regarding any of your medications? : No  Do you have all of your needed medical supplies or equipment (DME)?  (i.e. oxygen tank, CPAP, cane, etc.): Yes    Post-op ([facility] CTA Only)  If the patient had a surgery or procedure, do they have any questions for a nurse?: No        Follow up Plan     Discharge Follow-Up  Discharge follow up appointment scheduled in alignment with recommended follow up timeframe or Transitions of Risk Category? (Low = within 30 days; Moderate= within 14 days; High= within 7 days): No  Patient's follow up appointment not scheduled: Patient declined scheduling support. Education on the importance of transitions of care follow up. Provided scheduling phone number. (Patient plans to call [facility] on her own to schedule)    Future Appointments   Date Time  Provider Department Center   8/16/2024 11:00 AM Sammi Webb MD [facility]       Outpatient Plan as outlined on AVS reviewed with patient.    For any urgent concerns, please contact our 24 hour nurse triage line: [phone] ([phone])       ALY Rob

## 2024-08-15 NOTE — PROGRESS NOTES
[facility] New Visit Note    Chief Complaint: memory problems    History of Present Illness:  Ms. Sheriff is a 74 year old left-handed female presenting for evaluation of memory problems. She is accompanied to today's visit by her  Ravindra who assists in providing the history. 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    Ms. Sheriff reports that she started to notice some word finding difficulties in past 6 months. She described as trouble recalling medication names (like generic vs brand name) that she knows for years. She also made paraphasic errors when she substitutes words she supposed to say with wrong words. She also had times when she entered a room and forgot what she was supposed to do or sometimes forgot about her friends' last name. All these symptoms were gradually becoming noticeable but definitely unusual for her. When she is more anxious or tired, these difficulties are more noticeable.  Ravindra mentioned that in past one year she made some errors with trip arrangements like scheduling wrong dates for her friend to come over for a visit which are very unusual of [place]. She also noticed that her thought processes have been slowed down and there were times she had trouble processing verbal instructions from her [employer] classes when she asked for written instructions. She still manages her daily schedules, appointments and medications without any issues. She has always been maintaining a calendar to keep her daily schedules and have a to-do list to keep track of her responsibilities. No changes in her iADLs and denies any issues with direction or navigation.    Mood/behavior: She has a diagnosis of Bipolar disorder and has been on lamictal since 1999. She has been stable for years and denies any recent changes in medication dosage, or changes in personality. Denies any symptoms of delusions/paranoia, or visual or auditory  hallucinations.    Sleep: She takes ambien for sleep for years with average ~9 hours of sleep at night. After 2.5mg ambien, she can get into sleep quickly. Sometimes she wakes up after 4-5 hours of sleep and may need another dose to help her go back to sleep. Average 3-4 times/week she takes extra 2.5mg ambien to sleep. Denies any daytime sleepiness or acting out dreams.    Motor:  She has a recent fall in 7/31/24 because of unsteadiness on her feet from recent cellulitis infection on her right shin (currently getting antibiotics treatment). No other falls or prior head injury/trauma leading to any LOC episodes. Denies any imbalance or trouble with coordination.    ROS:  General: no weight loss or fever  Cardiac: no chest pain or palpitations  Pulmonary: no SOB or cough  GI: no abdominal pain, nausea, vomiting, or constipation  : no urinary urgency, pain or incontinence  Musculoskeletal: no joint pain or stiffness  Skin: no rashes or easy bruising  Neurological: as above  Pain Evaluation: denies any pain today. Right leg cellulitis with dressing covered.    Patient Active Problem List   Diagnosis    Transient cerebral ischemia    Wound infection   Menopausal disorder  Insomnia  TIA: negative for recent years. One remote event in 2014 when she was at work and had trouble unbuttoning her shirt; 6-8 hours later seen by neurology when her symptoms were mostly resolved. MRI/A brain at that time was negative. Another episode in the same year when she was doing exercise and noticed double vision for a short period of time then resolved. Later seen by neuro-ophthalmologist and was told it was unlikely stroke or TIA.  One episode of R hearing loss and treated with steroid later improved.      Past Medical History:   Diagnosis Date    Bipolar I disorder, most recent episode (or current) unspecified (HCC)     Cataracts, both eyes     Cervicalgia     Difficult intubation     Edema     Genital herpes in women     Malignant  hyperthermia     Other nonspecific findings on examination of blood(790.99)     PONV (postoperative nausea and vomiting)     Presbyopia     Pure hypercholesterolemia     Renal disease     RECURRENT UTI    Sciatica     Unspecified asthma(493.90)     Unspecified hypothyroidism     Unspecified hypothyroidism        Past Surgical History:   Procedure Laterality Date    ARTHROTOMY SHOULDER, SUBACROMIAL DECOMPRESSION, COMBINED      BLEPHAROPLASTY      COLONOSCOPY      D & C      BURGOS'S NEUROMA[      ORTHOPEDIC SURGERY      OSTEOTOMY FOOT  1/16/2013    Procedure: OSTEOTOMY FOOT;  REVISION RIGHT BUNION REPAIR; REVISION 2ND/3RD METATARSAL WEIL OSTEOTOMIES (C-ARM) (LATEX SENSITIVITY);  Surgeon: Marin Rosa MD;  Location: [facility]    TUBAL LIGATION         Current Outpatient Medications   Medication Sig Dispense Refill    albuterol (PROAIR HFA, PROVENTIL HFA, VENTOLIN HFA) 108 (90 BASE) MCG/ACT inhaler Inhale 2 puffs into the lungs every 4 hours as needed      atorvastatin (LIPITOR) 40 MG tablet Take 40 mg by mouth daily      bacitracin 500 UNIT/GM OINT Apply topically daily      calcium-vitamin D (CALTRATE) 600-400 MG-UNIT per tablet Take 1 tablet by mouth 2 times daily      cholecalciferol (VITAMIN D3) 125 mcg (5000 units) capsule Take 125 mcg by mouth daily      ciprofloxacin (CIPRO) 250 MG tablet Take 250 mg by mouth 2 times daily as needed (take for 5 days for bladder infection if needed)      ciprofloxacin (CIPRO) 750 MG tablet Take 1 tablet (750 mg) by mouth 2 times daily for 7 days 14 tablet 0    estradiol (VAGIFEM) 10 MCG TABS Place 10 mcg vaginally twice a week      fluconazole (DIFLUCAN) 150 MG tablet Take 150 mg by mouth once as needed (yeast infection)      hydrochlorothiazide (HYDRODIURIL) 50 MG tablet Take 50 mg by mouth daily      lamoTRIgine (LAMICTAL) 200 MG tablet Take by mouth 2 times daily Take 1/2 tablet (100mg) in the morning and 2 tablets (400mg) in the evening      levothyroxine  (SYNTHROID/LEVOTHROID) 100 MCG tablet Take 100 mcg by mouth every morning (before breakfast)      magnesium hydroxide (MILK OF MAGNESIA) 400 MG/5ML suspension Take 15 mLs by mouth at bedtime      nitroFURantoin macrocrystal (MACRODANTIN) 100 MG capsule Take 100 mg by mouth daily as needed (UTI)      Omega-3 Fatty Acids (OMEGA-3 FISH OIL PO) Take 1 g by mouth 2 times daily       omeprazole (PRILOSEC) 20 MG DR capsule Take 20 mg by mouth daily      ondansetron (ZOFRAN ODT) 4 MG ODT tab Take 1 tablet (4 mg) by mouth every 6 hours as needed for nausea or vomiting 20 tablet 1    sulfamethoxazole-trimethoprim (BACTRIM DS) 800-160 MG tablet Take 2 tablets by mouth 2 times daily X 10 days      valACYclovir (VALTREX) 1000 mg tablet Take 1,000 mg by mouth 2 times daily      zolpidem (AMBIEN) 5 MG tablet Take 2.5-5 mg by mouth nightly as needed for sleep          Allergies   Allergen Reactions    Zomepirac      ANGIOEDEMA      Latex Rash     rash       Family History:  Mother with Alzheimer's (at age of 70s)    Social History     Socioeconomic History    Marital status:      Spouse name: Not on file    Number of children: Not on file    Years of education: Not on file    Highest education level: Not on file   Occupational History    Not on file   Tobacco Use    Smoking status: Never    Smokeless tobacco: Not on file   Substance and Sexual Activity    Alcohol use: Yes     Comment: OCCAS.    Drug use: No    Sexual activity: Not on file   Other Topics Concern    Not on file   Social History Narrative    Not on file     Social Determinants of Health     Financial Resource Strain: High Risk (1/1/2022)    Received from [facility]    Financial Resource Strain     Difficulty of Paying Living Expenses: Not on file     Difficulty of Paying Living Expenses: Not on file   Food Insecurity: Not on file   Transportation Needs: Not on file   Physical Activity: Not on file   Stress: Not on file   Social  "Connections: Unknown (1/1/2022)    Received from [facility]    Social Connections     Frequency of Communication with Friends and Family: Not on file   Interpersonal Safety: Unknown (7/31/2024)    Received from [facility]    Humiliation, Afraid, Rape, and Kick questionnaire     Fear of Current or Ex-Partner: Not on file     Emotionally Abused: No     Physically Abused: No     Sexually Abused: No   Housing Stability: Not on file       General Physical examination:  /71 (BP Location: Right arm, Patient Position: Sitting, Cuff Size: Adult Regular)   Pulse 77   Temp 98.6  F (37  C) (Oral)   Resp 16   Ht 5' 5\" (165.1 cm)   Wt 146 lb (66.2 kg)   SpO2 93%   BMI 24.30 kg/m     General: Ms. Sheriff is well appearing and is appropriately groomed and dressed.  Ext: warm; right leg cellulitis site with dressing coverage some local edema and redness noted    Neurological examination:    Mental status: Ms. Sheriff  is awake, alert, and appropriate, with fluent speech, paraphasic errors noted during conversation but no difficulty in answering questions. Attention and concentration are intact. No apraxia is noted.  Cranial nerves:  Visual fields are full to confrontation with no visual extinction. Extraocular movements are intact. Smooth pursuit is intact. Saccades are normal in initiation, velocity and amplitude both vertically and horizontally.  Facial strength is full bilaterally.  Sternocledomastoid and trapezius muscle strength is full bilaterally.  Motor examination: Bulk is normal throughout. Axial and upper and lower extremity tone is normal. No pronator drift is noted. Strength is 5/5 and symmetric throughout. There is no tremor or other involuntary movement. No bradykinesia or cogwheel rigidity. Mild posture and action tremor noted at UE b/l L>R but no resting tremor.  Sensation: LT intact b/l throughout.  Coordination: Finger-nose-finger is normal with no dysmetria " bilaterally.  Rapid finger tapping, opening and closing of fist and pronation-supination are not slowed. Negative Romberg's sign.  Gait: She has an upright and steady stance with normal stride length and arm swing. Tandem walking is intact. Negative Retropulsion.    MoCA 27/30 (20+ years of education)  5/5 visuospatial/Executive  3/3 naming  6/6 attention  3/3 language  2/2 abstract  3/5 delayed recall (+2 with semantic cues; MIS 13/15)  5/6 orientation (-1 for date)    Neuropsychological evaluation:  N/A.    Labs:  Lab Results   Component Value Date    WBC 6.2 08/12/2024    RBC 3.32 (L) 08/12/2024    HGB 12.0 08/12/2024    HCT 34.5 (L) 08/12/2024     (H) 08/12/2024    MCH 36.1 (H) 08/12/2024    MCHC 34.8 08/12/2024    RDW 11.9 08/12/2024     08/12/2024     08/12/2024    POTASSIUM 4.1 08/12/2024    CHLORIDE 101 08/12/2024    CO2 24 08/12/2024    ANIONGAP 11 08/12/2024    GLC 99 08/12/2024    BUN 9.8 08/12/2024    CR 1.09 (H) 08/12/2024    JOELLE 9.3 08/12/2024    TSH 1.75 03/18/2014        Imaging:  Results for orders placed or performed during the hospital encounter of 03/17/14   MR Brain w/o & w Contrast    Narrative    MR BRAIN WITHOUT AND WITH CONTRAST 3/17/2014 1:39 PM    HISTORY: Acute confusion.    TECHNIQUE: Routine pulse sequences without and with contrast. 15 mL of  Magnevist given.    FINDINGS: Diffusion-weighted images are normal. The brain parenchyma,  brainstem, ventricular system, subarachnoid spaces, and vascular  structures are normal in appearance. Specifically, there is no  evidence for any acute infarction, acute hemorrhage, or any  intracranial mass lesions. Postcontrast images do not show any  abnormal enhancement.      Impression    IMPRESSION: Negative brain and brainstem MRI examination without and  with intravenous contrast.    DIGNA WASHINGTON MD   MRA Carotid & MR Angiogram    Narrative    MRA of the head without contrast  MRA of the neck without and with contrast    History:   TIA Workup.    Comparison:  MRI of the brain dated 3/17/2014    Technique:   MRA: 3D time-of-flight MR angiography of the major arteries at the  base of the brain was performed without contrast.  2D time-of-flight  MRA without contrast with superior and inferior saturation bands and  3D T1-weighted postgadolinium MRA of the neck/cervical vessels were  also obtained.    Contrast: 5.0 ml of gadavist    Findings:  MRA of the major arteries at the base of the brain  demonstrates no evidence of stenosis or aneurysm. The anterior  communicating artery appears patent. Regarding posterior communicating  arteries, both are patent.    MRA of the neck demonstrates patent bilateral internal and external  carotid arteries. There is no evidence of plaque formation in the  bilateral carotid bulbi or internal carotid artery origins. There is  no evidence of stenosis at the origins and course of the bilateral  vertebral arteries. The basilar artery appears patent.      Impression    Impression:  Normal MRA of the neck and the base of the brain.    I have personally reviewed the examination and initial interpretation  and I agree with the findings.    JOVANA DUNN MD   MRA Head w/o Contrast Angiogram    Narrative    MRA of the head without contrast  MRA of the neck without and with contrast    History:  TIA Workup.    Comparison:  MRI of the brain dated 3/17/2014    Technique:   MRA: 3D time-of-flight MR angiography of the major arteries at the  base of the brain was performed without contrast.  2D time-of-flight  MRA without contrast with superior and inferior saturation bands and  3D T1-weighted postgadolinium MRA of the neck/cervical vessels were  also obtained.    Contrast: 5.0 ml of gadavist    Findings:  MRA of the major arteries at the base of the brain  demonstrates no evidence of stenosis or aneurysm. The anterior  communicating artery appears patent. Regarding posterior communicating  arteries, both are patent.    MRA  of the neck demonstrates patent bilateral internal and external  carotid arteries. There is no evidence of plaque formation in the  bilateral carotid bulbi or internal carotid artery origins. There is  no evidence of stenosis at the origins and course of the bilateral  vertebral arteries. The basilar artery appears patent.      Impression    Impression:  Normal MRA of the neck and the base of the brain.    I have personally reviewed the examination and initial interpretation  and I agree with the findings.    JOVANA DUNN MD   Echo complete bubble     Value    XCELERA         Interpretation Summary  No cardiac source for embolus identified.  Global and regional left ventricular function is normal with an EF of 55-60%.   The atrial septum is intact as assessed by color Doppler and agitated   dextrose bubble study .  PatientHeight: 65 in  PatientWeight: 141 lbs  SystolicPressure: 119 mmHg  DiastolicPressure: 69 mmHg  BSA 1.7 m^2        Procedure  Bubble Echocardiogram with two-dimensional, color and spectral Doppler   performed.     Left Ventricle  Global and regional left ventricular function is normal with an EF of 55-60%.     Right Ventricle  The right ventricle is normal size.  Global right ventricular function is normal.     Atria  Both atria appear normal.  The atrial septum is intact as assessed by color Doppler and agitated   dextrose bubble study .     Mitral Valve  The mitral valve is normal.     Aortic Valve  Aortic valve is normal in structure and function.     Tricuspid Valve  The tricuspid valve is normal.  Trace tricuspid insufficiency is present.  The TR peak pressure gradient is 11 mmHg .     Pulmonic Valve  The pulmonic valve is normal.     Vessels  The inferior vena cava  is normal.     Pericardium  No pericardial effusion is present.     MMode 2D Measurements & Calculations  IVSd: 0.69 cm  LVIDd: 4.3 cm  LVIDs: 2.5 cm  LVPWd: 0.79 cm  FS: 41 %  LV mass(C)d: 96 grams  Ao root diam: 2.5 cm  LA  dimension: 3.9 cm  LA/Ao: 1.6   LVOT diam: 2.1 cm  Doppler Measurements & Calculations  MV E point: 81 cm/sec  MV A point: 53 cm/sec  MV E/A: 1.5   MV dec time: 0.17 sec  TR Max karen: 164 cm/sec  TR Max P mmHg     Interpreting Physician:  ALAN Mora MD electronically signed on 2014   12:28:51      2024 CT head  FINDINGS:   HEAD CT:   INTRACRANIAL CONTENTS: No intracranial hemorrhage, extraaxial collection, or mass effect.  No CT evidence of acute infarct. Normal parenchymal attenuation. Normal ventricles and sulci.      Impression:  Ms. Sheriff is a 74 year old left-handed female who presents with cognitive complaints, such as word finding difficulties and paraphasic errors, and STM issues that become progressively noticeable in past 6-12 months. 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 MOCA today 27/30 with 3/5 recall. The rest of neuro-exam unremarkable except posture and action tremor at UE. Family history of Alzheimer's disease. 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 With high cognitive reserve patients, MOCA may not be sensitive enough to detect subtle cognitive deficits. At this point, the etiologies are Alzheimer's disease possible as primary, and vascular risks as secondary. A lengthy discussion about treatment options such as anti-amyloid antibody therapies, eligibility criteria, benefits and risks.    Recommendations:  1. Amyloid PET scan.  2. APOE genotype testing.  3. Neuropsychological referral for more thorough evaluations on her cognitive function at baseline.  4. May consider to initiate ChEI to help with her cognitive function after PET scan workup.  5. Continue cognitive stimulating activities and physical exercise to help slow down the progression.    Follow-up: Return in about 2 months (video visit).    I spent 90  minutes total today for this visit, which includes face-to-face with the patient, reviewing the chart (CT and MRI images, lab reports, clinical notes, medications), ordering diagnostic test, counseling, and documentation.

## 2024-08-16 ENCOUNTER — OFFICE VISIT (OUTPATIENT)
Dept: NEUROLOGY | Facility: CLINIC | Age: 74
End: 2024-08-16
Payer: MEDICARE

## 2024-08-16 VITALS
RESPIRATION RATE: 16 BRPM | OXYGEN SATURATION: 93 % | HEIGHT: 65 IN | HEART RATE: 77 BPM | DIASTOLIC BLOOD PRESSURE: 71 MMHG | WEIGHT: 146 LBS | BODY MASS INDEX: 24.32 KG/M2 | SYSTOLIC BLOOD PRESSURE: 113 MMHG | TEMPERATURE: 98.6 F

## 2024-08-16 DIAGNOSIS — G31.84 MILD COGNITIVE IMPAIRMENT: Primary | ICD-10-CM

## 2024-08-16 DIAGNOSIS — G31.84 AMNESTIC MCI (MILD COGNITIVE IMPAIRMENT WITH MEMORY LOSS): Primary | ICD-10-CM

## 2024-08-16 ASSESSMENT — PAIN SCALES - GENERAL: PAINLEVEL: NO PAIN (0)

## 2024-08-16 NOTE — LETTER
8/16/2024       RE: Karrie Sheriff  [address] [address]     Dear Colleague,    Thank you for referring your patient, Karrie Sheriff, to the  [facility] at [facility]. Please see a copy of my visit note below.    [facility] New Visit Note    Chief Complaint: memory problems    History of Present Illness:  Ms. Sheriff is a 74 year old left-handed female presenting for evaluation of memory problems. She is accompanied to today's visit by her  Ravindra who assists in providing the history. 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    Ms. Sheriff reports that she started to notice some word finding difficulties in past 6 months. She described as trouble recalling medication names (like generic vs brand name) that she knows for years. She also made paraphasic errors when she substitutes words she supposed to say with wrong words. She also had times when she entered a room and forgot what she was supposed to do or sometimes forgot about her friends' last name. All these symptoms were gradually becoming noticeable but definitely unusual for her. When she is more anxious or tired, these difficulties are more noticeable.  Ravindra mentioned that in past one year she made some errors with trip arrangements like scheduling wrong dates for her friend to come over for a visit which are very unusual of Karrie. She also noticed that her thought processes have been slowed down and there were times she had trouble processing verbal instructions from her Pottery classes when she asked for written instructions. She still manages her daily schedules, appointments and medications without any issues. She has always been maintaining a calendar to keep her daily schedules and have a to-do list to keep track of her responsibilities. No changes in her iADLs and denies any issues with direction  or navigation.    Mood/behavior: She has a diagnosis of Bipolar disorder and has been on lamictal since 1999. She has been stable for years and denies any recent changes in medication dosage, or changes in personality. Denies any symptoms of delusions/paranoia, or visual or auditory hallucinations.    Sleep: She takes ambien for sleep for years with average ~9 hours of sleep at night. After 2.5mg ambien, she can get into sleep quickly. Sometimes she wakes up after 4-5 hours of sleep and may need another dose to help her go back to sleep. Average 3-4 times/week she takes extra 2.5mg ambien to sleep. Denies any daytime sleepiness or acting out dreams.    Motor:  She has a recent fall in 7/31/24 because of unsteadiness on her feet from recent cellulitis infection on her right shin (currently getting antibiotics treatment). No other falls or prior head injury/trauma leading to any LOC episodes. Denies any imbalance or trouble with coordination.    ROS:  General: no weight loss or fever  Cardiac: no chest pain or palpitations  Pulmonary: no SOB or cough  GI: no abdominal pain, nausea, vomiting, or constipation  : no urinary urgency, pain or incontinence  Musculoskeletal: no joint pain or stiffness  Skin: no rashes or easy bruising  Neurological: as above  Pain Evaluation: denies any pain today. Right leg cellulitis with dressing covered.    Patient Active Problem List   Diagnosis     Transient cerebral ischemia     Wound infection   Menopausal disorder  Insomnia  TIA: negative for recent years. One remote event in 2014 when she was at work and had trouble unbuttoning her shirt; 6-8 hours later seen by neurology when her symptoms were mostly resolved. MRI/A brain at that time was negative. Another episode in the same year when she was doing exercise and noticed double vision for a short period of time then resolved. Later seen by neuro-ophthalmologist and was told it was unlikely stroke or TIA.  One episode of R hearing  loss and treated with steroid later improved.      Past Medical History:   Diagnosis Date     Bipolar I disorder, most recent episode (or current) unspecified (HCC)      Cataracts, both eyes      Cervicalgia      Difficult intubation      Edema      Genital herpes in women      Malignant hyperthermia      Other nonspecific findings on examination of blood(790.99)      PONV (postoperative nausea and vomiting)      Presbyopia      Pure hypercholesterolemia      Renal disease     RECURRENT UTI     Sciatica      Unspecified asthma(493.90)      Unspecified hypothyroidism      Unspecified hypothyroidism        Past Surgical History:   Procedure Laterality Date     ARTHROTOMY SHOULDER, SUBACROMIAL DECOMPRESSION, COMBINED       BLEPHAROPLASTY       COLONOSCOPY       D & C       BURGOS'S NEUROMA[       ORTHOPEDIC SURGERY       OSTEOTOMY FOOT  1/16/2013    Procedure: OSTEOTOMY FOOT;  REVISION RIGHT BUNION REPAIR; REVISION 2ND/3RD METATARSAL WEIL OSTEOTOMIES (C-ARM) (LATEX SENSITIVITY);  Surgeon: Marin Rosa MD;  Location: [facility]     TUBAL LIGATION         Current Outpatient Medications   Medication Sig Dispense Refill     albuterol (PROAIR HFA, PROVENTIL HFA, VENTOLIN HFA) 108 (90 BASE) MCG/ACT inhaler Inhale 2 puffs into the lungs every 4 hours as needed       atorvastatin (LIPITOR) 40 MG tablet Take 40 mg by mouth daily       bacitracin 500 UNIT/GM OINT Apply topically daily       calcium-vitamin D (CALTRATE) 600-400 MG-UNIT per tablet Take 1 tablet by mouth 2 times daily       cholecalciferol (VITAMIN D3) 125 mcg (5000 units) capsule Take 125 mcg by mouth daily       ciprofloxacin (CIPRO) 250 MG tablet Take 250 mg by mouth 2 times daily as needed (take for 5 days for bladder infection if needed)       ciprofloxacin (CIPRO) 750 MG tablet Take 1 tablet (750 mg) by mouth 2 times daily for 7 days 14 tablet 0     estradiol (VAGIFEM) 10 MCG TABS Place 10 mcg vaginally twice a week       fluconazole (DIFLUCAN) 150 MG  tablet Take 150 mg by mouth once as needed (yeast infection)       hydrochlorothiazide (HYDRODIURIL) 50 MG tablet Take 50 mg by mouth daily       lamoTRIgine (LAMICTAL) 200 MG tablet Take by mouth 2 times daily Take 1/2 tablet (100mg) in the morning and 2 tablets (400mg) in the evening       levothyroxine (SYNTHROID/LEVOTHROID) 100 MCG tablet Take 100 mcg by mouth every morning (before breakfast)       magnesium hydroxide (MILK OF MAGNESIA) 400 MG/5ML suspension Take 15 mLs by mouth at bedtime       nitroFURantoin macrocrystal (MACRODANTIN) 100 MG capsule Take 100 mg by mouth daily as needed (UTI)       Omega-3 Fatty Acids (OMEGA-3 FISH OIL PO) Take 1 g by mouth 2 times daily        omeprazole (PRILOSEC) 20 MG DR capsule Take 20 mg by mouth daily       ondansetron (ZOFRAN ODT) 4 MG ODT tab Take 1 tablet (4 mg) by mouth every 6 hours as needed for nausea or vomiting 20 tablet 1     sulfamethoxazole-trimethoprim (BACTRIM DS) 800-160 MG tablet Take 2 tablets by mouth 2 times daily X 10 days       valACYclovir (VALTREX) 1000 mg tablet Take 1,000 mg by mouth 2 times daily       zolpidem (AMBIEN) 5 MG tablet Take 2.5-5 mg by mouth nightly as needed for sleep          Allergies   Allergen Reactions     Zomepirac      ANGIOEDEMA       Latex Rash     rash       Family History:  Mother with Alzheimer's (at age of 70s)    Social History     Socioeconomic History     Marital status:      Spouse name: Not on file     Number of children: Not on file     Years of education: Not on file     Highest education level: Not on file   Occupational History     Not on file   Tobacco Use     Smoking status: Never     Smokeless tobacco: Not on file   Substance and Sexual Activity     Alcohol use: Yes     Comment: OCCAS.     Drug use: No     Sexual activity: Not on file   Other Topics Concern     Not on file   Social History Narrative     Not on file     Social Determinants of Health     Financial Resource Strain: High Risk (1/1/2022)  "   Received from [facility]    Financial Resource Strain      Difficulty of Paying Living Expenses: Not on file      Difficulty of Paying Living Expenses: Not on file   Food Insecurity: Not on file   Transportation Needs: Not on file   Physical Activity: Not on file   Stress: Not on file   Social Connections: Unknown (1/1/2022)    Received from [facility] [employer] [facility]    Social Connections      Frequency of Communication with Friends and Family: Not on file   Interpersonal Safety: Unknown (7/31/2024)    [facility]    Humiliation, Afraid, Rape, and Kick questionnaire      Fear of Current or Ex-Partner: Not on file      Emotionally Abused: No      Physically Abused: No      Sexually Abused: No   Housing Stability: Not on file       General Physical examination:  /71 (BP Location: Right arm, Patient Position: Sitting, Cuff Size: Adult Regular)   Pulse 77   Temp 98.6  F (37  C) (Oral)   Resp 16   Ht 5' 5\" (165.1 cm)   Wt 146 lb (66.2 kg)   SpO2 93%   BMI 24.30 kg/m     General: Ms. Sheriff is well appearing and is appropriately groomed and dressed.  Ext: warm; right leg cellulitis site with dressing coverage some local edema and redness noted    Neurological examination:    Mental status: Ms. Sheriff  is awake, alert, and appropriate, with fluent speech, paraphasic errors noted during conversation but no difficulty in answering questions. Attention and concentration are intact. No apraxia is noted.  Cranial nerves:  Visual fields are full to confrontation with no visual extinction. Extraocular movements are intact. Smooth pursuit is intact. Saccades are normal in initiation, velocity and amplitude both vertically and horizontally.  Facial strength is full bilaterally.  Sternocledomastoid and trapezius muscle strength is full bilaterally.  Motor examination: Bulk is normal throughout. Axial and upper and lower extremity tone is normal. No " pronator drift is noted. Strength is 5/5 and symmetric throughout. There is no tremor or other involuntary movement. No bradykinesia or cogwheel rigidity. Mild posture and action tremor noted at UE b/l L>R but no resting tremor.  Sensation: LT intact b/l throughout.  Coordination: Finger-nose-finger is normal with no dysmetria bilaterally.  Rapid finger tapping, opening and closing of fist and pronation-supination are not slowed. Negative Romberg's sign.  Gait: She has an upright and steady stance with normal stride length and arm swing. Tandem walking is intact. Negative Retropulsion.    MoCA 27/30 (20+ years of education)  5/5 visuospatial/Executive  3/3 naming  6/6 attention  3/3 language  2/2 abstract  3/5 delayed recall (+2 with semantic cues; MIS 13/15)  5/6 orientation (-1 for date)    Neuropsychological evaluation:  N/A.    Labs:  Lab Results   Component Value Date    WBC 6.2 08/12/2024    RBC 3.32 (L) 08/12/2024    HGB 12.0 08/12/2024    HCT 34.5 (L) 08/12/2024     (H) 08/12/2024    MCH 36.1 (H) 08/12/2024    MCHC 34.8 08/12/2024    RDW 11.9 08/12/2024     08/12/2024     08/12/2024    POTASSIUM 4.1 08/12/2024    CHLORIDE 101 08/12/2024    CO2 24 08/12/2024    ANIONGAP 11 08/12/2024    GLC 99 08/12/2024    BUN 9.8 08/12/2024    CR 1.09 (H) 08/12/2024    JOELLE 9.3 08/12/2024    TSH 1.75 03/18/2014        Imaging:  Results for orders placed or performed during the hospital encounter of 03/17/14   MR Brain w/o & w Contrast    Narrative    MR BRAIN WITHOUT AND WITH CONTRAST 3/17/2014 1:39 PM    HISTORY: Acute confusion.    TECHNIQUE: Routine pulse sequences without and with contrast. 15 mL of  Magnevist given.    FINDINGS: Diffusion-weighted images are normal. The brain parenchyma,  brainstem, ventricular system, subarachnoid spaces, and vascular  structures are normal in appearance. Specifically, there is no  evidence for any acute infarction, acute hemorrhage, or any  intracranial mass  lesions. Postcontrast images do not show any  abnormal enhancement.      Impression    IMPRESSION: Negative brain and brainstem MRI examination without and  with intravenous contrast.    DIGNA WASHINGTON MD   MRA Carotid & MR Angiogram    Narrative    MRA of the head without contrast  MRA of the neck without and with contrast    History:  TIA Workup.    Comparison:  MRI of the brain dated 3/17/2014    Technique:   MRA: 3D time-of-flight MR angiography of the major arteries at the  base of the brain was performed without contrast.  2D time-of-flight  MRA without contrast with superior and inferior saturation bands and  3D T1-weighted postgadolinium MRA of the neck/cervical vessels were  also obtained.    Contrast: 5.0 ml of gadavist    Findings:  MRA of the major arteries at the base of the brain  demonstrates no evidence of stenosis or aneurysm. The anterior  communicating artery appears patent. Regarding posterior communicating  arteries, both are patent.    MRA of the neck demonstrates patent bilateral internal and external  carotid arteries. There is no evidence of plaque formation in the  bilateral carotid bulbi or internal carotid artery origins. There is  no evidence of stenosis at the origins and course of the bilateral  vertebral arteries. The basilar artery appears patent.      Impression    Impression:  Normal MRA of the neck and the base of the brain.    I have personally reviewed the examination and initial interpretation  and I agree with the findings.    JOVANA DUNN MD   MRA Head w/o Contrast Angiogram    Narrative    MRA of the head without contrast  MRA of the neck without and with contrast    History:  TIA Workup.    Comparison:  MRI of the brain dated 3/17/2014    Technique:   MRA: 3D time-of-flight MR angiography of the major arteries at the  base of the brain was performed without contrast.  2D time-of-flight  MRA without contrast with superior and inferior saturation bands and  3D T1-weighted  postgadolinium MRA of the neck/cervical vessels were  also obtained.    Contrast: 5.0 ml of gadavist    Findings:  MRA of the major arteries at the base of the brain  demonstrates no evidence of stenosis or aneurysm. The anterior  communicating artery appears patent. Regarding posterior communicating  arteries, both are patent.    MRA of the neck demonstrates patent bilateral internal and external  carotid arteries. There is no evidence of plaque formation in the  bilateral carotid bulbi or internal carotid artery origins. There is  no evidence of stenosis at the origins and course of the bilateral  vertebral arteries. The basilar artery appears patent.      Impression    Impression:  Normal MRA of the neck and the base of the brain.    I have personally reviewed the examination and initial interpretation  and I agree with the findings.    JOVANA DUNN MD   Echo complete bubble     Value    XCELERA         Interpretation Summary  No cardiac source for embolus identified.  Global and regional left ventricular function is normal with an EF of 55-60%.   The atrial septum is intact as assessed by color Doppler and agitated   dextrose bubble study .  PatientHeight: 65 in  PatientWeight: 141 lbs  SystolicPressure: 119 mmHg  DiastolicPressure: 69 mmHg  BSA 1.7 m^2        Procedure  Bubble Echocardiogram with two-dimensional, color and spectral Doppler   performed.     Left Ventricle  Global and regional left ventricular function is normal with an EF of 55-60%.     Right Ventricle  The right ventricle is normal size.  Global right ventricular function is normal.     Atria  Both atria appear normal.  The atrial septum is intact as assessed by color Doppler and agitated   dextrose bubble study .     Mitral Valve  The mitral valve is normal.     Aortic Valve  Aortic valve is normal in structure and function.     Tricuspid Valve  The tricuspid valve is normal.  Trace tricuspid insufficiency is present.  The TR peak pressure  gradient is 11 mmHg .     Pulmonic Valve  The pulmonic valve is normal.     Vessels  The inferior vena cava  is normal.     Pericardium  No pericardial effusion is present.     MMode 2D Measurements & Calculations  IVSd: 0.69 cm  LVIDd: 4.3 cm  LVIDs: 2.5 cm  LVPWd: 0.79 cm  FS: 41 %  LV mass(C)d: 96 grams  Ao root diam: 2.5 cm  LA dimension: 3.9 cm  LA/Ao: 1.6   LVOT diam: 2.1 cm  Doppler Measurements & Calculations  MV E point: 81 cm/sec  MV A point: 53 cm/sec  MV E/A: 1.5   MV dec time: 0.17 sec  TR Max karen: 164 cm/sec  TR Max P mmHg     Interpreting Physician:  ALAN Mora MD electronically signed on 2014   12:28:51      2024 CT head  FINDINGS:   HEAD CT:   INTRACRANIAL CONTENTS: No intracranial hemorrhage, extraaxial collection, or mass effect.  No CT evidence of acute infarct. Normal parenchymal attenuation. Normal ventricles and sulci.      Impression:  Ms. Sheriff is a 74 year old left-handed female who presents with cognitive complaints, such as word finding difficulties and paraphasic errors, and STM issues that become progressively noticeable in past 6-12 months. 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 MOCA today 27/30 with 3/5 recall. The rest of neuro-exam unremarkable except posture and action tremor at UE. Family history of Alzheimer's disease. 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 With high cognitive reserve patients, MOCA may not be sensitive enough to detect subtle cognitive deficits. At this point, the etiologies are Alzheimer's disease possible as primary, and vascular risks as secondary. A lengthy discussion about treatment options such as anti-amyloid antibody therapies, eligibility criteria, benefits and risks.    Recommendations:  1. Amyloid PET scan.  2. APOE genotype testing.  3. Neuropsychological referral  for more thorough evaluations on her cognitive function at baseline.  4. May consider to initiate ChEI to help with her cognitive function after PET scan workup.  5. Continue cognitive stimulating activities and physical exercise to help slow down the progression.    Follow-up: Return in about 2 months (video visit).    I spent 90 minutes total today for this visit, which includes face-to-face with the patient, reviewing the chart (CT and MRI images, lab reports, clinical notes, medications), ordering diagnostic test, counseling, and documentation.         Again, thank you for allowing me to participate in the care of your patient.      Sincerely,    Sammi Webb MD     Detail Level: Zone Detail Level: Generalized

## 2024-08-19 ENCOUNTER — LAB (OUTPATIENT)
Dept: LAB | Facility: CLINIC | Age: 74
End: 2024-08-19
Payer: MEDICARE

## 2024-08-19 DIAGNOSIS — G31.84 MILD COGNITIVE IMPAIRMENT: ICD-10-CM

## 2024-08-19 LAB
ANION GAP SERPL CALCULATED.3IONS-SCNC: 8 MMOL/L (ref 7–15)
BUN SERPL-MCNC: 13.6 MG/DL (ref 8–23)
CALCIUM SERPL-MCNC: 9.5 MG/DL (ref 8.8–10.4)
CHLORIDE SERPL-SCNC: 99 MMOL/L (ref 98–107)
CREAT SERPL-MCNC: 0.92 MG/DL (ref 0.51–0.95)
EGFRCR SERPLBLD CKD-EPI 2021: 66 ML/MIN/1.73M2
GLUCOSE SERPL-MCNC: 89 MG/DL (ref 70–99)
HCO3 SERPL-SCNC: 28 MMOL/L (ref 22–29)
Lab: NORMAL
PERFORMING LABORATORY: NORMAL
POTASSIUM SERPL-SCNC: 4.4 MMOL/L (ref 3.4–5.3)
SODIUM SERPL-SCNC: 135 MMOL/L (ref 135–145)
SPECIMEN STATUS: NORMAL
TEST NAME: NORMAL

## 2024-08-19 PROCEDURE — 36415 COLL VENOUS BLD VENIPUNCTURE: CPT

## 2024-08-19 PROCEDURE — 80048 BASIC METABOLIC PNL TOTAL CA: CPT

## 2024-08-24 LAB — MISCELLANEOUS TEST 1 (ARUP): NORMAL

## 2024-09-06 ENCOUNTER — ANCILLARY PROCEDURE (OUTPATIENT)
Dept: PET IMAGING | Facility: CLINIC | Age: 74
End: 2024-09-06
Attending: SPECIALIST
Payer: MEDICARE

## 2024-09-06 DIAGNOSIS — G31.84 MILD COGNITIVE IMPAIRMENT: ICD-10-CM

## 2024-10-12 ENCOUNTER — HEALTH MAINTENANCE LETTER (OUTPATIENT)
Age: 74
End: 2024-10-12

## 2025-03-13 ENCOUNTER — TELEPHONE (OUTPATIENT)
Dept: NEUROPSYCHOLOGY | Facility: CLINIC | Age: 75
End: 2025-03-13
Payer: MEDICARE

## 2025-03-13 NOTE — TELEPHONE ENCOUNTER
Left Voicemail (1st Attempt) and Sent Mychart (1st Attempt) for the patient to call back and schedule the following:    Appointment type: Neuropsych Eval  Provider: Any  Return date: next avail  Specialty phone number: [phone]  Additional appointment(s) needed: NA  Additonal Notes:       Please sched Full Eval In Person or Interview Only (virtual) followed by Testing Only within 2 weeks of the interview

## 2025-03-17 ENCOUNTER — TELEPHONE (OUTPATIENT)
Dept: NEUROPSYCHOLOGY | Facility: CLINIC | Age: 75
End: 2025-03-17
Payer: MEDICARE

## 2025-03-17 NOTE — TELEPHONE ENCOUNTER
Left Voicemail (1st Attempt) and Sent Mychart (1st Attempt) for the patient to call back and schedule the following:    Appointment type: Neuropsych Eval  Provider: Dr. Gomez  Return date: next avail  Specialty phone number: [phone]  Additional appointment(s) needed: NA  Additonal Notes:       Please resched pt's Full Eval, or virtual Interview Only followed by Testing Only within 2 weeks of the interview